# Patient Record
Sex: FEMALE | Race: WHITE | NOT HISPANIC OR LATINO
[De-identification: names, ages, dates, MRNs, and addresses within clinical notes are randomized per-mention and may not be internally consistent; named-entity substitution may affect disease eponyms.]

---

## 2021-08-06 ENCOUNTER — TRANSCRIPTION ENCOUNTER (OUTPATIENT)
Age: 36
End: 2021-08-06

## 2021-09-18 ENCOUNTER — EMERGENCY (EMERGENCY)
Facility: HOSPITAL | Age: 36
LOS: 0 days | Discharge: HOME | End: 2021-09-18
Attending: EMERGENCY MEDICINE
Payer: COMMERCIAL

## 2021-09-18 VITALS
HEART RATE: 84 BPM | DIASTOLIC BLOOD PRESSURE: 72 MMHG | TEMPERATURE: 99 F | RESPIRATION RATE: 18 BRPM | SYSTOLIC BLOOD PRESSURE: 139 MMHG | OXYGEN SATURATION: 100 %

## 2021-09-18 VITALS — WEIGHT: 175.05 LBS | HEIGHT: 65 IN

## 2021-09-18 DIAGNOSIS — S32.029A UNSPECIFIED FRACTURE OF SECOND LUMBAR VERTEBRA, INITIAL ENCOUNTER FOR CLOSED FRACTURE: ICD-10-CM

## 2021-09-18 DIAGNOSIS — S32.019A UNSPECIFIED FRACTURE OF FIRST LUMBAR VERTEBRA, INITIAL ENCOUNTER FOR CLOSED FRACTURE: ICD-10-CM
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
## 2021-09-18 DIAGNOSIS — M54.5 LOW BACK PAIN: ICD-10-CM

## 2021-09-18 DIAGNOSIS — S32.039A UNSPECIFIED FRACTURE OF THIRD LUMBAR VERTEBRA, INITIAL ENCOUNTER FOR CLOSED FRACTURE: ICD-10-CM

## 2021-09-18 PROCEDURE — 99284 EMERGENCY DEPT VISIT MOD MDM: CPT | Mod: 25

## 2021-09-18 PROCEDURE — 99284 EMERGENCY DEPT VISIT MOD MDM: CPT

## 2021-09-18 PROCEDURE — 72131 CT LUMBAR SPINE W/O DYE: CPT | Mod: 26,ME

## 2021-09-18 PROCEDURE — 72131 CT LUMBAR SPINE W/O DYE: CPT

## 2021-09-18 PROCEDURE — G1004: CPT

## 2021-09-18 RX ORDER — OXYCODONE HYDROCHLORIDE 5 MG/1
1 TABLET ORAL
Qty: 3 | Refills: 0
Start: 2021-09-18 | End: 2021-09-20

## 2021-09-18 RX ORDER — LIDOCAINE 4 G/100G
1 CREAM TOPICAL ONCE
Refills: 0 | Status: COMPLETED | OUTPATIENT
Start: 2021-09-18 | End: 2021-09-18

## 2021-09-18 RX ORDER — OXYCODONE AND ACETAMINOPHEN 5; 325 MG/1; MG/1
1 TABLET ORAL ONCE
Refills: 0 | Status: DISCONTINUED | OUTPATIENT
Start: 2021-09-18 | End: 2021-09-18

## 2021-09-18 RX ADMIN — LIDOCAINE 1 PATCH: 4 CREAM TOPICAL at 13:03

## 2021-09-18 RX ADMIN — OXYCODONE AND ACETAMINOPHEN 1 TABLET(S): 5; 325 TABLET ORAL at 13:03

## 2021-09-18 NOTE — ED STATDOCS - CARE PROVIDER_API CALL
Dwight Damico)  Orthopaedic Surgery  [address] [place] [address]  Phone: [phone]  Fax: [phone]  Follow Up Time:

## 2021-09-18 NOTE — ED STATDOCS - NPI NUMBER (FOR SYSADMIN USE ONLY) :
[facility]ON  [address]  [place] [address]  Phone: [phone]    December 4, 2018        Estella Helyan  [address] [place] [id number]          To whom it may concern:    RE: Estella Vernonyan    Please excuse from school 12/5/18.    Please contact me for questions or concerns.      Sincerely,        Aleyda Beltran NP  
[[phone]]

## 2021-09-18 NOTE — ED STATDOCS - MUSCULOSKELETAL, MLM
range of motion is not limited. Large abrasion to right lumbar region with TTP. No midline tenderness. No step off deformity. Right SI tenderness.

## 2021-09-18 NOTE — ED STATDOCS - PATIENT PORTAL LINK FT
You can access the FollowMyHealth Patient Portal offered by [facility] by registering at the following website: [email or web address]. By joining [facility]’s FollowMyHealth portal, you will also be able to view your health information using other applications (apps) compatible with our system.

## 2021-09-18 NOTE — ED STATDOCS - PROGRESS NOTE DETAILS
Corazon Lucero MD (PGY2) -  35yo F no PMH, PSx egg retrieval in May presents with RL back pain. States she was in the attic assessing insulation, suddenly fell, broke her fall, no head trauma. Denies any lightheadedness, dizziness, vision changes prior to the fall. Felt some aching to the lower back after the fall, this pain intensified 20 minutes after the fall. Feels safe at home. Has not taken any meds for relief of symptoms. States she has been walking okay since fall. Initially had some R shoulder pain, which has since self resolved. No urinary/fecal incontinence. Reports intact sensation. MDM: 35yo no PMH presents with right lower back pain after a fall. Has bruising to the R lower back. No spinal tenderness, no step offs, normal gait. Normal sensation. Plan to obtain urine pregnancy, CT lumbar spine, r/o bleed vs spinal injury vs musculoskeletal injury, give analgesics, reassess. TAMARA Corazon Lucero MD (PGY2) - CT with L1-L3 transverse process fracture. Spoke to Dr. Damico re patient, per him okay to discharge at this time and follow up in his office this upcoming Monday. Pt seen & reassessed.  Pt symptomatically improved.  NAD, VSS, pt tolerated PO & ambulated w/steady unassisted gait in the ED.  We discussed the results of ED w/u w/patient (incl. presumptive Emergency Department dx, associated anticipatory guidance, stressing importance of prompt f/u, return precautions), & gave them a copy of results.  Patient verbalized understanding of ED course & agreed with our f/u recommendations, has decisional making capacity.  Pt st they will f/u w/PMD within the next 3 days; pt agrees to call today or tomorrow for an appointment. Pt agrees to return to the ED if there is any worsening or concerning symptoms.

## 2021-09-18 NOTE — ED STATDOCS - NSFOLLOWUPINSTRUCTIONS_ED_ALL_ED_FT
A transverse process fracture is a break or crack in the back part of a vertebra. Your healthcare provider will give you specific instructions. This depends on where the fracture happened and how severe it is.    DISCHARGE INSTRUCTIONS:    Call your local emergency number (911 in the [place]) or have someone else call if:   •You feel lightheaded, short of breath, or have chest pain.  •You cough up blood.  •You cannot move your arms or legs.    For pain:  Rest  Alternate use of Ice/Hot packs  Salonpas (lidocaine patch analog - over the counter)   Tylenol (up to 1000mg or 1 g) up to every 6 hours  Motrin (up to 600mg) up to every 6 hours  Limit exertional activity    Return to the emergency department if:   •Your arm or leg feels warm, tender, and painful. It may look swollen and red.  •You have pain in your abdomen.  •You are dizzy, weak, pale, or faint.  •You are confused or have trouble thinking clearly.  •You see blood in your urine, or your urine is dark.  •You have new problems urinating or having bowel movements.  •You have severe pain.      Call your doctor or orthopedist if:   •You cannot sleep or rest because of pain.  •You have pain or swelling that is getting worse or does not go away.  •You have questions or concerns about your condition or care.    Medicines: You may need any of the following:  •Acetaminophen decreases pain and fever. It is available without a doctor's order. Ask how much to take and how often to take it. Follow directions. Read the labels of all other medicines you are using to see if they also contain acetaminophen, or ask your doctor or pharmacist. Acetaminophen can cause liver damage if not taken correctly. Do not use more than 4 grams (4,000 milligrams) total of acetaminophen in one day.     •NSAIDs, such as ibuprofen, help decrease swelling, pain, and fever. NSAIDs can cause stomach bleeding or kidney problems in certain people. If you take blood thinner medicine, always ask your healthcare provider if NSAIDs are safe for you. Always read the medicine label and follow directions.    •Prescription pain medicine may be given. Ask your healthcare provider how to take this medicine safely. Some prescription pain medicines contain acetaminophen. Do not take other medicines that contain acetaminophen without talking to your healthcare provider. Too much acetaminophen may cause liver damage. Prescription pain medicine may cause constipation. Ask your healthcare provider how to prevent or treat constipation.     •Muscle relaxers may be needed.    •Take your medicine as directed. Contact your healthcare provider if you think your medicine is not helping or if you have side effects. Tell him or her if you are allergic to any medicine. Keep a list of the medicines, vitamins, and herbs you take. Include the amounts, and when and why you take them. Bring the list or the pill bottles to follow-up visits. Carry your medicine list with you in case of an emergency.      Manage your symptoms: The fracture may take weeks or months to heal. The following can help manage your symptoms:    •Rest as directed. Rest will help the fracture heal. Avoid activities that can cause pain or more injury. Begin normal, slow movements as directed by your healthcare provider. Your provider will tell you when it is okay to drive and to return to your normal activities. He or she can also help you make a plan to return to sports, if needed.    •Apply ice to help decrease swelling and pain. Use an ice pack, or put crushed ice in a plastic bag. Cover it with a towel before you apply it to your skin. Apply ice for 15 to 20 minutes every hour or as directed.    •Wear a back brace, if directed. A back brace may help you feel more comfortable while the fracture heals. Your healthcare provider will tell you the kind of brace to use. He or she will tell you how long to wear it each day, and for how many days.    •Go to physical therapy, if directed. A physical therapist teaches you exercises to help improve movement and strength, and to decrease pain.      Prevent more injury:   •Strengthen your muscles and bones. Weight-bearing activities such as walking helps strengthen bones. Activities that help strengthen muscles, such as weightlifting, help protect your bones. Your healthcare provider can help you create a safe physical activity plan. He or she can also help you manage any condition that weakens your bones, such as osteoporosis.    •Reach or maintain a healthy weight. Extra weight puts more stress on your back. Your healthcare provider can help you create a safe weight loss plan, if needed.    •Get more calcium and vitamin D, as directed. Calcium and vitamin D work together to make bones stronger. Good sources of calcium are milk, cheese, broccoli, tofu, almonds, and canned salmon or sardines. Vitamin D is in fish oils, some vegetables, and fortified milk, cereal, or bread. Vitamin D is also formed in the skin when it is exposed to the sun.    •Lower your risk for injuries or accidents. Always wear a seatbelt when you are in a car or other vehicle. Keep walkways in your home clear so you will not trip as you walk. Use handrails when you go up or down stairs.    •Play sports safely. Wear proper protective gear. Take breaks when you practice if your sport involves repeating the same motion. Use proper body mechanics for your sport.    Follow up with your doctor or orthopedist as directed: You may need to return for x-rays or other tests. Write down your questions so you remember to ask them during your visits.

## 2021-09-18 NOTE — ED ADULT TRIAGE NOTE - CHIEF COMPLAINT QUOTE
Pt. to the ED C/O Right Back side Pain S/P Fall from Standing Height in Attic this morning- Pt. denies injuries to head, neck and abdomen- denies blood thinners and major medical hx-

## 2021-09-18 NOTE — ED STATDOCS - CLINICAL SUMMARY MEDICAL DECISION MAKING FREE TEXT BOX
Corazon Lucero MD (PGY2) -  35yo F no PMH presents with RL back pain, bruising to the area. TTP. Plan: CT lumbar spine, analgesics, reassess.

## 2022-08-11 ENCOUNTER — NON-APPOINTMENT (OUTPATIENT)
Age: 37
End: 2022-08-11

## 2022-08-12 PROBLEM — Z00.00 ENCOUNTER FOR PREVENTIVE HEALTH EXAMINATION: Status: ACTIVE | Noted: 2022-08-12

## 2023-02-24 NOTE — ED STATDOCS - OBJECTIVE STATEMENT
done
37 y/o female with no significant PMHx presents to the ED c/o right sided back pain s/p fall at 10:30am today. Pt reports she was removing insulation from the attic when she fell through. Reports he back scraped up against a beam. Not on anticoagulation. Pt took Ibuprofen. No other complaints at this time.

## 2023-11-10 PROBLEM — Z78.9 OTHER SPECIFIED HEALTH STATUS: Chronic | Status: ACTIVE | Noted: 2021-09-18

## 2023-11-21 ENCOUNTER — APPOINTMENT (OUTPATIENT)
Dept: OBGYN | Facility: CLINIC | Age: 38
End: 2023-11-21
Payer: COMMERCIAL

## 2023-11-21 VITALS
SYSTOLIC BLOOD PRESSURE: 124 MMHG | BODY MASS INDEX: 30.51 KG/M2 | WEIGHT: 183.13 LBS | HEIGHT: 65 IN | DIASTOLIC BLOOD PRESSURE: 84 MMHG

## 2023-11-21 PROCEDURE — 99204 OFFICE O/P NEW MOD 45 MIN: CPT | Mod: 25

## 2023-11-21 PROCEDURE — 76817 TRANSVAGINAL US OBSTETRIC: CPT

## 2023-12-01 ENCOUNTER — ASOB RESULT (OUTPATIENT)
Age: 38
End: 2023-12-01

## 2023-12-01 ENCOUNTER — APPOINTMENT (OUTPATIENT)
Dept: MATERNAL FETAL MEDICINE | Facility: CLINIC | Age: 38
End: 2023-12-01
Payer: COMMERCIAL

## 2023-12-01 DIAGNOSIS — O09.819 SUPERVISION OF PREGNANCY RESULTING FROM ASSISTED REPRODUCTIVE TECHNOLOGY, UNSPECIFIED TRIMESTER: ICD-10-CM

## 2023-12-01 PROCEDURE — 99202 OFFICE O/P NEW SF 15 MIN: CPT | Mod: 95

## 2023-12-03 ENCOUNTER — LABORATORY RESULT (OUTPATIENT)
Age: 38
End: 2023-12-03

## 2023-12-04 ENCOUNTER — ASOB RESULT (OUTPATIENT)
Age: 38
End: 2023-12-04

## 2023-12-04 ENCOUNTER — APPOINTMENT (OUTPATIENT)
Dept: ANTEPARTUM | Facility: CLINIC | Age: 38
End: 2023-12-04
Payer: COMMERCIAL

## 2023-12-04 PROCEDURE — 36416 COLLJ CAPILLARY BLOOD SPEC: CPT

## 2023-12-04 PROCEDURE — 76813 OB US NUCHAL MEAS 1 GEST: CPT

## 2023-12-04 PROCEDURE — 36415 COLL VENOUS BLD VENIPUNCTURE: CPT

## 2023-12-08 LAB
ADDITIONAL US: NORMAL
COMMENTS: AFP: NORMAL
CRL SCAN TWIN B: NORMAL
CRL SCAN: NORMAL
CROWN RUMP LENGTH TWIN B: NORMAL
CROWN RUMP LENGTH: 61.1 MM
DOWN SYNDROME AGE RISK: NORMAL
DOWN SYNDROME INTERPRETATION: NORMAL
DOWN SYNDROME SCREENING RISK: NORMAL
GEST. AGE ON COLLECTION DATE: 13.1 WEEKS
HCG MOM: 1.5
HCG VALUE: 115.9 IU/ML
MATERNAL AGE AT EDD: 41 YR
NOTE: AFP: NORMAL
NT MOM TWIN B: NORMAL
NT TWIN B: NORMAL
NUCHAL TRANSLUCENCY (NT): 1.3 MM
NUCHAL TRANSLUCENCY MOM: 0.82
NUMBER OF FETUSES: 1
PAPP-A MOM: 1.06
PAPP-A VALUE: 1072.7 NG/ML
RACE: NORMAL
RESULTS AFP: NORMAL
SONOGRAPHER ID#: NORMAL
SUBMIT PART 2 SAMPLE USING: NORMAL
TEST RESULTS: AFP: NORMAL
TRISOMY 18 AGE RISK: NORMAL
TRISOMY 18 INTERPRETATION: NORMAL
TRISOMY 18 SCREENING RISK: NORMAL
WEIGHT AFP: 183 LBS

## 2023-12-10 ENCOUNTER — NON-APPOINTMENT (OUTPATIENT)
Age: 38
End: 2023-12-10

## 2023-12-19 ENCOUNTER — NON-APPOINTMENT (OUTPATIENT)
Age: 38
End: 2023-12-19

## 2023-12-20 ENCOUNTER — NON-APPOINTMENT (OUTPATIENT)
Age: 38
End: 2023-12-20

## 2023-12-20 ENCOUNTER — APPOINTMENT (OUTPATIENT)
Dept: OBGYN | Facility: CLINIC | Age: 38
End: 2023-12-20
Payer: COMMERCIAL

## 2023-12-20 VITALS
BODY MASS INDEX: 30.32 KG/M2 | WEIGHT: 182 LBS | DIASTOLIC BLOOD PRESSURE: 76 MMHG | SYSTOLIC BLOOD PRESSURE: 120 MMHG | HEIGHT: 65 IN

## 2023-12-20 DIAGNOSIS — Z34.91 ENCOUNTER FOR SUPERVISION OF NORMAL PREGNANCY, UNSPECIFIED, FIRST TRIMESTER: ICD-10-CM

## 2023-12-20 LAB
BILIRUB UR QL STRIP: NORMAL
CLARITY UR: CLEAR
GLUCOSE UR-MCNC: NORMAL
HCG UR QL: 0.2 EU/DL
HGB UR QL STRIP.AUTO: NORMAL
KETONES UR-MCNC: NORMAL
LEUKOCYTE ESTERASE UR QL STRIP: NORMAL
NITRITE UR QL STRIP: NORMAL
PH UR STRIP: 6.5
PROT UR STRIP-MCNC: NORMAL
SP GR UR STRIP: 1.01

## 2023-12-20 PROCEDURE — 99215 OFFICE O/P EST HI 40 MIN: CPT | Mod: 25

## 2023-12-20 PROCEDURE — 81003 URINALYSIS AUTO W/O SCOPE: CPT | Mod: QW

## 2023-12-20 PROCEDURE — 76816 OB US FOLLOW-UP PER FETUS: CPT

## 2023-12-20 PROCEDURE — 36415 COLL VENOUS BLD VENIPUNCTURE: CPT

## 2023-12-20 NOTE — HISTORY OF PRESENT ILLNESS
[FreeTextEntry1] : HPI: Patient is a 37yo female presenting for her New OB visit. IVF pregnancy KAYCEE: 6/15/24 Gestational Age today: 14w4d Patient is without complaints today. Denies pain or VB.  Office US: +FCA, 151 bpm  OB Hx:   Gyn Hx:  No known fibroids, ovarian cysts, or other gynecologic problems  PMH: none PSH: hysteroscopy  Meds: PNV All:NKDA SH: neg x 3  Gyn: Normal appearing external genitalia, normal appearing vagina and cervix  Breast: No lymphadenopathy in the neck, chest wall, bilateral supraclavicular, infraclavicular, and bilateral axillary areas.  No overt asymmetry in bilateral breast contour with normal appearing skin and normal appearing nipple areolar complex bilaterally.  No nipple discharge expressed.  Plan: [x] ACOG labs - T&S, CBC, hgb electrophoresis, rubella, rubeola, varicella, UA/UCx, pap, Lead, HIV/Hep B&C/RPR/GC/CT sent today [x] s/p Influenza vaccine & RSV vaccine recommended [x] Covid vaccine and booster recommendations reviewed with the patient  [x] Expanded Carrier Screening - genetics consult given and screened with IVF [x] NT sono / Ultrascreen appt  normal [x] NIPT LR [x] Nursing interview / packet / [place] scanned [x] Seq 2 [ ] Anatomy sono  [ ] 1hr GCT / CBC [ ] Tdap vaccine  [ ] GBS/HIV/CBC  Obesity - BMI is 30 - Early 1hr GCT - ASA at 12 weeks - Nutrition Consult - Reviewed risks of obesity related DM and HTN in pregnancy along with risks for  and postterm births and labor complications - Serial growth US  AMA - discussed aneuploidy testing and invasive testing options - ASA at 12 weeks - 3rd trimester growth US - Antepartum testing 32-36wks - IOL at 39wks  45mins spent excluding sono RTO 4wks  JEFE Small MD

## 2023-12-28 ENCOUNTER — TRANSCRIPTION ENCOUNTER (OUTPATIENT)
Age: 38
End: 2023-12-28

## 2024-01-16 ENCOUNTER — NON-APPOINTMENT (OUTPATIENT)
Age: 39
End: 2024-01-16

## 2024-01-17 ENCOUNTER — NON-APPOINTMENT (OUTPATIENT)
Age: 39
End: 2024-01-17

## 2024-01-17 ENCOUNTER — APPOINTMENT (OUTPATIENT)
Dept: OBGYN | Facility: CLINIC | Age: 39
End: 2024-01-17
Payer: COMMERCIAL

## 2024-01-17 VITALS
WEIGHT: 182 LBS | HEIGHT: 65 IN | BODY MASS INDEX: 30.32 KG/M2 | SYSTOLIC BLOOD PRESSURE: 118 MMHG | DIASTOLIC BLOOD PRESSURE: 78 MMHG

## 2024-01-17 PROCEDURE — 0502F SUBSEQUENT PRENATAL CARE: CPT

## 2024-01-23 ENCOUNTER — APPOINTMENT (OUTPATIENT)
Dept: PEDIATRIC CARDIOLOGY | Facility: CLINIC | Age: 39
End: 2024-01-23
Payer: COMMERCIAL

## 2024-01-23 PROCEDURE — 76825 ECHO EXAM OF FETAL HEART: CPT

## 2024-01-23 PROCEDURE — 99203 OFFICE O/P NEW LOW 30 MIN: CPT | Mod: 25

## 2024-01-23 PROCEDURE — 76827 ECHO EXAM OF FETAL HEART: CPT

## 2024-01-23 PROCEDURE — 93325 DOPPLER ECHO COLOR FLOW MAPG: CPT | Mod: 59

## 2024-01-23 PROCEDURE — 76820 UMBILICAL ARTERY ECHO: CPT

## 2024-01-26 ENCOUNTER — NON-APPOINTMENT (OUTPATIENT)
Age: 39
End: 2024-01-26

## 2024-01-29 ENCOUNTER — NON-APPOINTMENT (OUTPATIENT)
Age: 39
End: 2024-01-29

## 2024-01-29 ENCOUNTER — ASOB RESULT (OUTPATIENT)
Age: 39
End: 2024-01-29

## 2024-01-29 ENCOUNTER — APPOINTMENT (OUTPATIENT)
Dept: ANTEPARTUM | Facility: CLINIC | Age: 39
End: 2024-01-29
Payer: COMMERCIAL

## 2024-01-29 PROCEDURE — 76811 OB US DETAILED SNGL FETUS: CPT

## 2024-02-19 ENCOUNTER — NON-APPOINTMENT (OUTPATIENT)
Age: 39
End: 2024-02-19

## 2024-02-21 ENCOUNTER — APPOINTMENT (OUTPATIENT)
Dept: OBGYN | Facility: CLINIC | Age: 39
End: 2024-02-21
Payer: COMMERCIAL

## 2024-02-21 VITALS
DIASTOLIC BLOOD PRESSURE: 64 MMHG | HEIGHT: 65 IN | SYSTOLIC BLOOD PRESSURE: 130 MMHG | BODY MASS INDEX: 30.99 KG/M2 | WEIGHT: 186 LBS

## 2024-02-21 VITALS — DIASTOLIC BLOOD PRESSURE: 80 MMHG | SYSTOLIC BLOOD PRESSURE: 122 MMHG

## 2024-02-21 PROCEDURE — 81003 URINALYSIS AUTO W/O SCOPE: CPT | Mod: NC,QW

## 2024-02-21 PROCEDURE — 0502F SUBSEQUENT PRENATAL CARE: CPT

## 2024-02-27 LAB
ABO + RH PNL BLD: NORMAL
ADDITIONAL US: NORMAL
AFP MOM: 2.09
AFP VALUE: 90.4 NG/ML
APPEARANCE: CLEAR
APPEARANCE: CLEAR
BACTERIA UR CULT: NORMAL
BASOPHILS # BLD AUTO: 0.02 K/UL
BASOPHILS NFR BLD AUTO: 0.2 %
BILIRUBIN URINE: NEGATIVE
BILIRUBIN URINE: NEGATIVE
BLD GP AB SCN SERPL QL: NORMAL
BLOOD URINE: NEGATIVE
BLOOD URINE: NEGATIVE
C TRACH RRNA SPEC QL NAA+PROBE: NOT DETECTED
COLLECTED ON 2: NORMAL
COLLECTED ON: NORMAL
COLOR: YELLOW
COLOR: YELLOW
CRL SCAN TWIN B: NORMAL
CRL SCAN: NORMAL
CROWN RUMP LENGTH TWIN B: NORMAL
CROWN RUMP LENGTH: 61.1 MM
CYTOLOGY CVX/VAG DOC THIN PREP: NORMAL
DIA MOM: 0.67
DIA VALUE: 99.1 PG/ML
DOWN SYNDROME AGE RISK: NORMAL
DOWN SYNDROME INTERPRETATION: NORMAL
DOWN SYNDROME SCREENING RISK: NORMAL
EOSINOPHIL # BLD AUTO: 0.09 K/UL
EOSINOPHIL NFR BLD AUTO: 1 %
ESTIMATED AVERAGE GLUCOSE: 108 MG/DL
FIRST TRIMESTER SAMPLE: NORMAL
GEST. AGE ON COLLECTION DATE: 13.1 WEEKS
GESTATIONAL AGE: 19.4 WEEKS
GLUCOSE 1H P 50 G GLC PO SERPL-MCNC: 147 MG/DL
GLUCOSE QUALITATIVE U: NEGATIVE
GLUCOSE QUALITATIVE U: NEGATIVE MG/DL
HBA1C MFR BLD HPLC: 5.4 %
HBV SURFACE AG SER QL: NONREACTIVE
HCG MOM: 1.47
HCG VALUE: 30.2 IU/ML
HCT VFR BLD CALC: 35.9 %
HCV AB SER QL: NONREACTIVE
HCV S/CO RATIO: 0.08 S/CO
HGB A MFR BLD: 97.2 %
HGB A2 MFR BLD: 2.8 %
HGB BLD-MCNC: 12.3 G/DL
HGB FRACT BLD-IMP: NORMAL
HIV1+2 AB SPEC QL IA.RAPID: NONREACTIVE
HPV HIGH+LOW RISK DNA PNL CVX: NOT DETECTED
IMM GRANULOCYTES NFR BLD AUTO: 0.3 %
INSULIN DEP DIABETES: NO
KETONES URINE: NEGATIVE
KETONES URINE: NEGATIVE MG/DL
LEAD BLD-MCNC: <1 UG/DL
LEUKOCYTE ESTERASE URINE: NEGATIVE
LEUKOCYTE ESTERASE URINE: NEGATIVE
LYMPHOCYTES # BLD AUTO: 3.03 K/UL
LYMPHOCYTES NFR BLD AUTO: 32 %
M TB IFN-G BLD-IMP: NEGATIVE
MAN DIFF?: NORMAL
MATERNAL AGE AT EDD: 41 YR
MCHC RBC-ENTMCNC: 30.6 PG
MCHC RBC-ENTMCNC: 34.3 GM/DL
MCV RBC AUTO: 89.3 FL
MEV IGG FLD QL IA: 26.2 AU/ML
MEV IGG+IGM SER-IMP: POSITIVE
MONOCYTES # BLD AUTO: 0.57 K/UL
MONOCYTES NFR BLD AUTO: 6 %
N GONORRHOEA RRNA SPEC QL NAA+PROBE: NOT DETECTED
NEUTROPHILS # BLD AUTO: 5.73 K/UL
NEUTROPHILS NFR BLD AUTO: 60.5 %
NITRITE URINE: NEGATIVE
NITRITE URINE: NEGATIVE
NT MOM TWIN B: NORMAL
NT TWIN B: NORMAL
NUCHAL TRANSLUCENCY (NT): 1.3 MM
NUCHAL TRANSLUCENCY MOM: 0.82
NUMBER OF FETUSES: 1
OPEN SPINA BIFIDA: NORMAL
OSB INTERPRETATION: NORMAL
PAPP-A MOM: 1.06
PAPP-A VALUE: 1072.7 NG/ML
PH URINE: 6.5
PH URINE: 7
PLATELET # BLD AUTO: 306 K/UL
PROTEIN URINE: NEGATIVE
PROTEIN URINE: NEGATIVE MG/DL
QUANTIFERON TB PLUS MITOGEN MINUS NIL: >10 IU/ML
QUANTIFERON TB PLUS NIL: 0.03 IU/ML
QUANTIFERON TB PLUS TB1 MINUS NIL: 0 IU/ML
QUANTIFERON TB PLUS TB2 MINUS NIL: 0 IU/ML
RACE: NORMAL
RBC # BLD: 4.02 M/UL
RBC # FLD: 12.5 %
RUBV IGG FLD-ACNC: 9.1 INDEX
RUBV IGG SER-IMP: POSITIVE
SECOND TRIMESTER SAMPLE: NORMAL
SEQUENTIAL 2 COMMENTS: NORMAL
SEQUENTIAL 2 NOTE: NORMAL
SEQUENTIAL 2 RESULTS: NORMAL
SEQUENTIAL 2 TEST RESULTS: NORMAL
SONOGRAPHER ID#: NORMAL
SOURCE AMPLIFICATION: NORMAL
SOURCE TP AMPLIFICATION: NORMAL
SPECIFIC GRAVITY URINE: 1.01
SPECIFIC GRAVITY URINE: 1.01
T PALLIDUM AB SER QL IA: NEGATIVE
T VAGINALIS RRNA SPEC QL NAA+PROBE: NOT DETECTED
TRISOMY 18 AGE RISK: NORMAL
TRISOMY 18 INTERPRETATION: NORMAL
TRISOMY 18 SCREENING RISK: NORMAL
UE3 MOM: 0.78
UE3 VALUE: 1.44 NG/ML
UROBILINOGEN URINE: 0.2 (ref 0.2–?)
UROBILINOGEN URINE: 0.2 MG/DL
VZV AB TITR SER: POSITIVE
VZV IGG SER IF-ACNC: 1805 INDEX
WBC # FLD AUTO: 9.47 K/UL
WEIGHT AFP: 183 LBS
WEIGHT: 185 LBS

## 2024-03-20 ENCOUNTER — NON-APPOINTMENT (OUTPATIENT)
Age: 39
End: 2024-03-20

## 2024-03-21 ENCOUNTER — APPOINTMENT (OUTPATIENT)
Dept: OBGYN | Facility: CLINIC | Age: 39
End: 2024-03-21
Payer: COMMERCIAL

## 2024-03-21 VITALS
HEIGHT: 65 IN | WEIGHT: 191 LBS | DIASTOLIC BLOOD PRESSURE: 70 MMHG | BODY MASS INDEX: 31.82 KG/M2 | RESPIRATION RATE: 61 BRPM | SYSTOLIC BLOOD PRESSURE: 116 MMHG

## 2024-03-21 DIAGNOSIS — Z34.92 ENCOUNTER FOR SUPERVISION OF NORMAL PREGNANCY, UNSPECIFIED, SECOND TRIMESTER: ICD-10-CM

## 2024-03-21 PROCEDURE — 81003 URINALYSIS AUTO W/O SCOPE: CPT | Mod: NC,QW

## 2024-03-21 PROCEDURE — 0502F SUBSEQUENT PRENATAL CARE: CPT

## 2024-03-29 ENCOUNTER — ASOB RESULT (OUTPATIENT)
Age: 39
End: 2024-03-29

## 2024-03-29 ENCOUNTER — APPOINTMENT (OUTPATIENT)
Dept: ANTEPARTUM | Facility: CLINIC | Age: 39
End: 2024-03-29
Payer: COMMERCIAL

## 2024-03-29 PROCEDURE — 76819 FETAL BIOPHYS PROFIL W/O NST: CPT | Mod: 59

## 2024-03-29 PROCEDURE — 76816 OB US FOLLOW-UP PER FETUS: CPT

## 2024-04-12 LAB
BASOPHILS # BLD AUTO: 0.02 K/UL
BASOPHILS NFR BLD AUTO: 0.2 %
EOSINOPHIL # BLD AUTO: 0.13 K/UL
EOSINOPHIL NFR BLD AUTO: 1.4 %
HCT VFR BLD CALC: 35.5 %
HGB BLD-MCNC: 11.9 G/DL
IMM GRANULOCYTES NFR BLD AUTO: 0.9 %
LYMPHOCYTES # BLD AUTO: 1.89 K/UL
LYMPHOCYTES NFR BLD AUTO: 19.7 %
MAN DIFF?: NORMAL
MCHC RBC-ENTMCNC: 30.8 PG
MCHC RBC-ENTMCNC: 33.5 GM/DL
MCV RBC AUTO: 92 FL
MONOCYTES # BLD AUTO: 0.48 K/UL
MONOCYTES NFR BLD AUTO: 5 %
NEUTROPHILS # BLD AUTO: 6.98 K/UL
NEUTROPHILS NFR BLD AUTO: 72.8 %
PLATELET # BLD AUTO: 285 K/UL
RBC # BLD: 3.86 M/UL
RBC # FLD: 13.2 %
T PALLIDUM AB SER QL IA: NEGATIVE
WBC # FLD AUTO: 9.59 K/UL

## 2024-04-17 ENCOUNTER — NON-APPOINTMENT (OUTPATIENT)
Age: 39
End: 2024-04-17

## 2024-04-17 ENCOUNTER — APPOINTMENT (OUTPATIENT)
Dept: MATERNAL FETAL MEDICINE | Facility: CLINIC | Age: 39
End: 2024-04-17
Payer: COMMERCIAL

## 2024-04-17 ENCOUNTER — ASOB RESULT (OUTPATIENT)
Age: 39
End: 2024-04-17

## 2024-04-17 VITALS — HEIGHT: 65 IN | BODY MASS INDEX: 31.82 KG/M2 | WEIGHT: 191 LBS

## 2024-04-17 DIAGNOSIS — O24.410 GESTATIONAL DIABETES MELLITUS IN PREGNANCY, DIET CONTROLLED: ICD-10-CM

## 2024-04-17 PROCEDURE — G0108 DIAB MANAGE TRN  PER INDIV: CPT | Mod: 95

## 2024-04-17 RX ORDER — LANCETS 33 GAUGE
EACH MISCELLANEOUS
Qty: 1 | Refills: 2 | Status: ACTIVE | COMMUNITY
Start: 2024-04-17 | End: 1900-01-01

## 2024-04-17 RX ORDER — BLOOD-GLUCOSE METER
W/DEVICE EACH MISCELLANEOUS
Qty: 1 | Refills: 0 | Status: ACTIVE | COMMUNITY
Start: 2024-04-17 | End: 1900-01-01

## 2024-04-17 RX ORDER — BLOOD SUGAR DIAGNOSTIC
STRIP MISCELLANEOUS 4 TIMES DAILY
Qty: 1 | Refills: 2 | Status: ACTIVE | COMMUNITY
Start: 2024-04-17 | End: 1900-01-01

## 2024-04-18 ENCOUNTER — APPOINTMENT (OUTPATIENT)
Dept: OBGYN | Facility: CLINIC | Age: 39
End: 2024-04-18
Payer: COMMERCIAL

## 2024-04-18 VITALS
SYSTOLIC BLOOD PRESSURE: 142 MMHG | HEIGHT: 65 IN | WEIGHT: 197 LBS | DIASTOLIC BLOOD PRESSURE: 90 MMHG | BODY MASS INDEX: 32.82 KG/M2

## 2024-04-18 VITALS — SYSTOLIC BLOOD PRESSURE: 134 MMHG | DIASTOLIC BLOOD PRESSURE: 84 MMHG

## 2024-04-18 LAB
BILIRUB UR QL STRIP: NORMAL
GLUCOSE UR-MCNC: NORMAL
HCG UR QL: 0.2 EU/DL
HGB UR QL STRIP.AUTO: NORMAL
KETONES UR-MCNC: NORMAL
LEUKOCYTE ESTERASE UR QL STRIP: NORMAL
NITRITE UR QL STRIP: NORMAL
PH UR STRIP: 7
PROT UR STRIP-MCNC: NORMAL
SP GR UR STRIP: 1.01

## 2024-04-18 PROCEDURE — 90471 IMMUNIZATION ADMIN: CPT

## 2024-04-18 PROCEDURE — 0502F SUBSEQUENT PRENATAL CARE: CPT

## 2024-04-18 PROCEDURE — 36415 COLL VENOUS BLD VENIPUNCTURE: CPT

## 2024-04-18 PROCEDURE — 81003 URINALYSIS AUTO W/O SCOPE: CPT | Mod: NC,QW

## 2024-04-18 PROCEDURE — 90715 TDAP VACCINE 7 YRS/> IM: CPT

## 2024-04-26 ENCOUNTER — APPOINTMENT (OUTPATIENT)
Dept: ANTEPARTUM | Facility: CLINIC | Age: 39
End: 2024-04-26
Payer: COMMERCIAL

## 2024-04-26 ENCOUNTER — ASOB RESULT (OUTPATIENT)
Age: 39
End: 2024-04-26

## 2024-04-26 LAB
ALBUMIN SERPL ELPH-MCNC: 3.6 G/DL
ALP BLD-CCNC: 108 U/L
ALT SERPL-CCNC: 15 U/L
ANION GAP SERPL CALC-SCNC: 15 MMOL/L
AST SERPL-CCNC: 16 U/L
BASOPHILS # BLD AUTO: 0.01 K/UL
BASOPHILS NFR BLD AUTO: 0.1 %
BILIRUB SERPL-MCNC: 0.2 MG/DL
BUN SERPL-MCNC: 9 MG/DL
CALCIUM SERPL-MCNC: 8.8 MG/DL
CHLORIDE SERPL-SCNC: 103 MMOL/L
CO2 SERPL-SCNC: 20 MMOL/L
CREAT SERPL-MCNC: 0.69 MG/DL
CREAT SPEC-SCNC: 37 MG/DL
CREAT/PROT UR: NORMAL RATIO
EGFR: 112 ML/MIN/1.73M2
EOSINOPHIL # BLD AUTO: 0.08 K/UL
EOSINOPHIL NFR BLD AUTO: 0.9 %
GLUCOSE SERPL-MCNC: 68 MG/DL
HCT VFR BLD CALC: 33.9 %
HGB BLD-MCNC: 11.1 G/DL
IMM GRANULOCYTES NFR BLD AUTO: 0.7 %
LYMPHOCYTES # BLD AUTO: 2.24 K/UL
LYMPHOCYTES NFR BLD AUTO: 25.9 %
MAN DIFF?: NORMAL
MCHC RBC-ENTMCNC: 30.3 PG
MCHC RBC-ENTMCNC: 32.7 GM/DL
MCV RBC AUTO: 92.6 FL
MONOCYTES # BLD AUTO: 0.54 K/UL
MONOCYTES NFR BLD AUTO: 6.3 %
NEUTROPHILS # BLD AUTO: 5.71 K/UL
NEUTROPHILS NFR BLD AUTO: 66.1 %
PLATELET # BLD AUTO: 279 K/UL
POTASSIUM SERPL-SCNC: 4.2 MMOL/L
PROT SERPL-MCNC: 6 G/DL
PROT UR-MCNC: <4 MG/DL
RBC # BLD: 3.66 M/UL
RBC # FLD: 13 %
SODIUM SERPL-SCNC: 139 MMOL/L
WBC # FLD AUTO: 8.64 K/UL

## 2024-04-26 PROCEDURE — 76816 OB US FOLLOW-UP PER FETUS: CPT

## 2024-04-29 DIAGNOSIS — O09.519 SUPERVISION OF ELDERLY PRIMIGRAVIDA, UNSPECIFIED TRIMESTER: ICD-10-CM

## 2024-04-30 ENCOUNTER — APPOINTMENT (OUTPATIENT)
Dept: OBGYN | Facility: CLINIC | Age: 39
End: 2024-04-30
Payer: COMMERCIAL

## 2024-04-30 VITALS
HEIGHT: 65 IN | WEIGHT: 195 LBS | DIASTOLIC BLOOD PRESSURE: 86 MMHG | BODY MASS INDEX: 32.49 KG/M2 | SYSTOLIC BLOOD PRESSURE: 114 MMHG

## 2024-04-30 LAB
BILIRUB UR QL STRIP: NORMAL
CLARITY UR: CLEAR
COLLECTION METHOD: NORMAL
GLUCOSE UR-MCNC: NORMAL
HCG UR QL: 0.2 EU/DL
HGB UR QL STRIP.AUTO: NORMAL
KETONES UR-MCNC: NORMAL
LEUKOCYTE ESTERASE UR QL STRIP: NORMAL
NITRITE UR QL STRIP: NORMAL
PH UR STRIP: 6.5
PROT UR STRIP-MCNC: NORMAL
SP GR UR STRIP: 1.02

## 2024-04-30 PROCEDURE — 0502F SUBSEQUENT PRENATAL CARE: CPT

## 2024-05-03 ENCOUNTER — APPOINTMENT (OUTPATIENT)
Dept: MATERNAL FETAL MEDICINE | Facility: CLINIC | Age: 39
End: 2024-05-03
Payer: COMMERCIAL

## 2024-05-03 ENCOUNTER — APPOINTMENT (OUTPATIENT)
Dept: ANTEPARTUM | Facility: CLINIC | Age: 39
End: 2024-05-03
Payer: COMMERCIAL

## 2024-05-03 VITALS
BODY MASS INDEX: 32.45 KG/M2 | DIASTOLIC BLOOD PRESSURE: 80 MMHG | OXYGEN SATURATION: 97 % | WEIGHT: 195 LBS | HEART RATE: 86 BPM | SYSTOLIC BLOOD PRESSURE: 120 MMHG

## 2024-05-03 DIAGNOSIS — Z3A.33 33 WEEKS GESTATION OF PREGNANCY: ICD-10-CM

## 2024-05-03 PROCEDURE — ZZZZZ: CPT

## 2024-05-03 PROCEDURE — 99204 OFFICE O/P NEW MOD 45 MIN: CPT

## 2024-05-03 PROCEDURE — 99214 OFFICE O/P EST MOD 30 MIN: CPT

## 2024-05-03 RX ORDER — KRILL/OM-3/DHA/EPA/PHOSPHO/AST 1000-230MG
81 CAPSULE ORAL
Refills: 0 | Status: ACTIVE | COMMUNITY
Start: 2024-05-03

## 2024-05-03 RX ORDER — CHOLECALCIFEROL (VITAMIN D3) 25 MCG
27-0.8-228 TABLET,CHEWABLE ORAL
Refills: 0 | Status: ACTIVE | COMMUNITY
Start: 2024-05-03

## 2024-05-03 RX ORDER — OMEPRAZOLE MAGNESIUM 20 MG/1
20 TABLET, DELAYED RELEASE ORAL DAILY
Refills: 0 | Status: ACTIVE | COMMUNITY
Start: 2024-05-03

## 2024-05-03 NOTE — DISCUSSION/SUMMARY
[FreeTextEntry1] : Delivery is planned at 39+ weeks.   Weekly fetal testing to begin at 36 weeks.   Repeat growth scan in 3 weeks.   Continue testing glucose 4X daily

## 2024-05-03 NOTE — HISTORY OF PRESENT ILLNESS
[FreeTextEntry1] : Dorie presents for evaluation of glucose control.  She had an interval growth sonogram performed last week

## 2024-05-03 NOTE — DATA REVIEWED
[FreeTextEntry1] : We reviewed the sonogram which was performed last week, demonstratng overall size at the 56th percentile.   Weekly fetal testing is scheduled at 36 weeks.   Review of home glucose shows excellent control of fasting and post prandial with occasional mild elevations due to dietary choices, but Dorie has a good understanding of the diet and will continue to adjust as needed.   We reviewed the plans for the rest of the pregnancy as far as monitoring, and she states there is a plan for delivery at 39+ weeks.

## 2024-05-13 ENCOUNTER — NON-APPOINTMENT (OUTPATIENT)
Age: 39
End: 2024-05-13

## 2024-05-13 ENCOUNTER — APPOINTMENT (OUTPATIENT)
Dept: MATERNAL FETAL MEDICINE | Facility: CLINIC | Age: 39
End: 2024-05-13
Payer: COMMERCIAL

## 2024-05-13 ENCOUNTER — ASOB RESULT (OUTPATIENT)
Age: 39
End: 2024-05-13

## 2024-05-13 VITALS — WEIGHT: 195 LBS | BODY MASS INDEX: 32.49 KG/M2 | HEIGHT: 65 IN

## 2024-05-13 PROCEDURE — 98960 EDU&TRN PT SELF-MGMT NQHP 1: CPT | Mod: 95

## 2024-05-13 PROCEDURE — G0108 DIAB MANAGE TRN  PER INDIV: CPT | Mod: 95

## 2024-05-14 ENCOUNTER — APPOINTMENT (OUTPATIENT)
Dept: OBGYN | Facility: CLINIC | Age: 39
End: 2024-05-14
Payer: COMMERCIAL

## 2024-05-14 VITALS
SYSTOLIC BLOOD PRESSURE: 122 MMHG | WEIGHT: 198 LBS | DIASTOLIC BLOOD PRESSURE: 86 MMHG | BODY MASS INDEX: 32.99 KG/M2 | HEIGHT: 65 IN

## 2024-05-14 DIAGNOSIS — Z34.93 ENCOUNTER FOR SUPERVISION OF NORMAL PREGNANCY, UNSPECIFIED, THIRD TRIMESTER: ICD-10-CM

## 2024-05-14 PROCEDURE — 0502F SUBSEQUENT PRENATAL CARE: CPT

## 2024-05-14 PROCEDURE — 36415 COLL VENOUS BLD VENIPUNCTURE: CPT

## 2024-05-14 PROCEDURE — 81003 URINALYSIS AUTO W/O SCOPE: CPT | Mod: NC,QW

## 2024-05-16 LAB
BASOPHILS # BLD AUTO: 0.01 K/UL
BASOPHILS NFR BLD AUTO: 0.1 %
EOSINOPHIL # BLD AUTO: 0.07 K/UL
EOSINOPHIL NFR BLD AUTO: 0.7 %
HCT VFR BLD CALC: 37.9 %
HGB BLD-MCNC: 12 G/DL
HIV1+2 AB SPEC QL IA.RAPID: NONREACTIVE
IMM GRANULOCYTES NFR BLD AUTO: 0.5 %
LYMPHOCYTES # BLD AUTO: 2.13 K/UL
LYMPHOCYTES NFR BLD AUTO: 20.3 %
MAN DIFF?: NORMAL
MCHC RBC-ENTMCNC: 29.4 PG
MCHC RBC-ENTMCNC: 31.7 GM/DL
MCV RBC AUTO: 92.9 FL
MONOCYTES # BLD AUTO: 0.58 K/UL
MONOCYTES NFR BLD AUTO: 5.5 %
NEUTROPHILS # BLD AUTO: 7.63 K/UL
NEUTROPHILS NFR BLD AUTO: 72.9 %
PLATELET # BLD AUTO: 252 K/UL
RBC # BLD: 4.08 M/UL
RBC # FLD: 13.5 %
T PALLIDUM AB SER QL IA: NEGATIVE
WBC # FLD AUTO: 10.47 K/UL

## 2024-05-20 ENCOUNTER — APPOINTMENT (OUTPATIENT)
Dept: ANTEPARTUM | Facility: CLINIC | Age: 39
End: 2024-05-20
Payer: COMMERCIAL

## 2024-05-20 ENCOUNTER — ASOB RESULT (OUTPATIENT)
Age: 39
End: 2024-05-20

## 2024-05-20 ENCOUNTER — OUTPATIENT (OUTPATIENT)
Dept: INPATIENT UNIT | Facility: HOSPITAL | Age: 39
LOS: 1 days | Discharge: HOME | End: 2024-05-20
Payer: COMMERCIAL

## 2024-05-20 VITALS
TEMPERATURE: 98 F | HEART RATE: 88 BPM | DIASTOLIC BLOOD PRESSURE: 98 MMHG | RESPIRATION RATE: 18 BRPM | SYSTOLIC BLOOD PRESSURE: 135 MMHG

## 2024-05-20 DIAGNOSIS — O26.899 OTHER SPECIFIED PREGNANCY RELATED CONDITIONS, UNSPECIFIED TRIMESTER: ICD-10-CM

## 2024-05-20 LAB
ALBUMIN SERPL ELPH-MCNC: 2.5 G/DL — LOW (ref 3.3–5)
ALP SERPL-CCNC: 156 U/L — HIGH (ref 40–120)
ALT FLD-CCNC: 24 U/L — SIGNIFICANT CHANGE UP (ref 12–78)
ANION GAP SERPL CALC-SCNC: 9 MMOL/L — SIGNIFICANT CHANGE UP (ref 5–17)
APPEARANCE UR: CLEAR — SIGNIFICANT CHANGE UP
AST SERPL-CCNC: 17 U/L — SIGNIFICANT CHANGE UP (ref 15–37)
BASOPHILS # BLD AUTO: 0.01 K/UL — SIGNIFICANT CHANGE UP (ref 0–0.2)
BASOPHILS NFR BLD AUTO: 0.1 % — SIGNIFICANT CHANGE UP (ref 0–2)
BILIRUB SERPL-MCNC: 0.3 MG/DL — SIGNIFICANT CHANGE UP (ref 0.2–1.2)
BILIRUB UR-MCNC: NEGATIVE — SIGNIFICANT CHANGE UP
BUN SERPL-MCNC: 8 MG/DL — SIGNIFICANT CHANGE UP (ref 7–23)
CALCIUM SERPL-MCNC: 9.2 MG/DL — SIGNIFICANT CHANGE UP (ref 8.5–10.1)
CHLORIDE SERPL-SCNC: 110 MMOL/L — HIGH (ref 96–108)
CO2 SERPL-SCNC: 20 MMOL/L — LOW (ref 22–31)
COLOR SPEC: YELLOW — SIGNIFICANT CHANGE UP
CREAT ?TM UR-MCNC: 24 MG/DL — SIGNIFICANT CHANGE UP
CREAT SERPL-MCNC: 0.69 MG/DL — SIGNIFICANT CHANGE UP (ref 0.5–1.3)
DIFF PNL FLD: NEGATIVE — SIGNIFICANT CHANGE UP
EGFR: 112 ML/MIN/1.73M2 — SIGNIFICANT CHANGE UP
EOSINOPHIL # BLD AUTO: 0.06 K/UL — SIGNIFICANT CHANGE UP (ref 0–0.5)
EOSINOPHIL NFR BLD AUTO: 0.6 % — SIGNIFICANT CHANGE UP (ref 0–6)
GLUCOSE SERPL-MCNC: 78 MG/DL — SIGNIFICANT CHANGE UP (ref 70–99)
GLUCOSE UR QL: NEGATIVE MG/DL — SIGNIFICANT CHANGE UP
GP B STREP DNA SPEC QL NAA+PROBE: NOT DETECTED
HCT VFR BLD CALC: 35.7 % — SIGNIFICANT CHANGE UP (ref 34.5–45)
HGB BLD-MCNC: 11.9 G/DL — SIGNIFICANT CHANGE UP (ref 11.5–15.5)
IMM GRANULOCYTES NFR BLD AUTO: 0.6 % — SIGNIFICANT CHANGE UP (ref 0–0.9)
KETONES UR-MCNC: ABNORMAL MG/DL
LDH SERPL L TO P-CCNC: 173 U/L — SIGNIFICANT CHANGE UP (ref 84–241)
LEUKOCYTE ESTERASE UR-ACNC: NEGATIVE — SIGNIFICANT CHANGE UP
LYMPHOCYTES # BLD AUTO: 2.67 K/UL — SIGNIFICANT CHANGE UP (ref 1–3.3)
LYMPHOCYTES # BLD AUTO: 27.1 % — SIGNIFICANT CHANGE UP (ref 13–44)
MCHC RBC-ENTMCNC: 30.3 PG — SIGNIFICANT CHANGE UP (ref 27–34)
MCHC RBC-ENTMCNC: 33.3 GM/DL — SIGNIFICANT CHANGE UP (ref 32–36)
MCV RBC AUTO: 90.8 FL — SIGNIFICANT CHANGE UP (ref 80–100)
MONOCYTES # BLD AUTO: 0.71 K/UL — SIGNIFICANT CHANGE UP (ref 0–0.9)
MONOCYTES NFR BLD AUTO: 7.2 % — SIGNIFICANT CHANGE UP (ref 2–14)
NEUTROPHILS # BLD AUTO: 6.33 K/UL — SIGNIFICANT CHANGE UP (ref 1.8–7.4)
NEUTROPHILS NFR BLD AUTO: 64.4 % — SIGNIFICANT CHANGE UP (ref 43–77)
NITRITE UR-MCNC: NEGATIVE — SIGNIFICANT CHANGE UP
PH UR: 7 — SIGNIFICANT CHANGE UP (ref 5–8)
PLATELET # BLD AUTO: 234 K/UL — SIGNIFICANT CHANGE UP (ref 150–400)
POTASSIUM SERPL-MCNC: 3.7 MMOL/L — SIGNIFICANT CHANGE UP (ref 3.5–5.3)
POTASSIUM SERPL-SCNC: 3.7 MMOL/L — SIGNIFICANT CHANGE UP (ref 3.5–5.3)
PROT ?TM UR-MCNC: <5 MG/DL — SIGNIFICANT CHANGE UP (ref 0–12)
PROT SERPL-MCNC: 6.1 GM/DL — SIGNIFICANT CHANGE UP (ref 6–8.3)
PROT UR-MCNC: NEGATIVE MG/DL — SIGNIFICANT CHANGE UP
PROT/CREAT UR-RTO: <0.2 RATIO — SIGNIFICANT CHANGE UP (ref 0–0.2)
RBC # BLD: 3.93 M/UL — SIGNIFICANT CHANGE UP (ref 3.8–5.2)
RBC # FLD: 13.1 % — SIGNIFICANT CHANGE UP (ref 10.3–14.5)
SODIUM SERPL-SCNC: 139 MMOL/L — SIGNIFICANT CHANGE UP (ref 135–145)
SOURCE GBS: NORMAL
SP GR SPEC: 1 — SIGNIFICANT CHANGE UP (ref 1–1.03)
URATE SERPL-MCNC: 5.5 MG/DL — SIGNIFICANT CHANGE UP (ref 2.5–7)
UROBILINOGEN FLD QL: 0.2 MG/DL — SIGNIFICANT CHANGE UP (ref 0.2–1)
WBC # BLD: 9.84 K/UL — SIGNIFICANT CHANGE UP (ref 3.8–10.5)
WBC # FLD AUTO: 9.84 K/UL — SIGNIFICANT CHANGE UP (ref 3.8–10.5)

## 2024-05-20 PROCEDURE — 85025 COMPLETE CBC W/AUTO DIFF WBC: CPT

## 2024-05-20 PROCEDURE — ZZZZZ: CPT

## 2024-05-20 PROCEDURE — 80053 COMPREHEN METABOLIC PANEL: CPT

## 2024-05-20 PROCEDURE — 59025 FETAL NON-STRESS TEST: CPT | Mod: 26,59

## 2024-05-20 PROCEDURE — 59025 FETAL NON-STRESS TEST: CPT

## 2024-05-20 PROCEDURE — 76816 OB US FOLLOW-UP PER FETUS: CPT

## 2024-05-20 PROCEDURE — 99214 OFFICE O/P EST MOD 30 MIN: CPT

## 2024-05-20 PROCEDURE — 36415 COLL VENOUS BLD VENIPUNCTURE: CPT

## 2024-05-20 PROCEDURE — 84156 ASSAY OF PROTEIN URINE: CPT

## 2024-05-20 PROCEDURE — 81003 URINALYSIS AUTO W/O SCOPE: CPT

## 2024-05-20 PROCEDURE — 84550 ASSAY OF BLOOD/URIC ACID: CPT

## 2024-05-20 PROCEDURE — 99221 1ST HOSP IP/OBS SF/LOW 40: CPT | Mod: 25,GC

## 2024-05-20 PROCEDURE — 76818 FETAL BIOPHYS PROFILE W/NST: CPT

## 2024-05-20 PROCEDURE — 83615 LACTATE (LD) (LDH) ENZYME: CPT

## 2024-05-20 PROCEDURE — 82570 ASSAY OF URINE CREATININE: CPT

## 2024-05-20 NOTE — OB PROVIDER TRIAGE NOTE - NSHPPHYSICALEXAM_GEN_ALL_CORE
T(C): 36.9 (05-20-24 @ 19:16), Max: 36.9 (05-20-24 @ 19:16)  HR: 88 (05-20-24 @ 19:16) (88 - 88)  BP: 135/98 (05-20-24 @ 19:16) (135/98 - 135/98)  RR: 18 (05-20-24 @ 19:16) (18 - 18)  SpO2: --  Gen: NAD, well-appearing  Pulm: Resting comfortably on room air  Abd: Soft, gravid  Ext: 2+ pitting edema, non-tender     FHT: 125, moderate variability, +accelerations, -decelerations   [place]: none

## 2024-05-20 NOTE — OB PROVIDER TRIAGE NOTE - HISTORY OF PRESENT ILLNESS
ROBBIN PRADO is a 38y  at 36w2d who presents for elevated BPs. She had MFM appt with Dr. Hyatt today with BP of 130-140s/80-90s. She had a previous visit with Dr. Small in April was BP of 130-140s/80-90s. Here in triage, her BPs are 130-150s/80-100s. She denies headaches, visual disturbances, RUQ pain, epigastric pain. She reports her LE edema has gotten worse.  She denies vaginal bleeding, contractions and leakage of fluid. She reports good fetal movement.     Pregnancy course is significant for:  -IVF pregnancy with IM>vaginal progesterone  -AMA    POB: G1 current  PGYN: Denies fibroids, denies cysts, denies STD hx, denies abnormal PAPs  PMH: Denies  PSH: Hysteroscopy   SH: Denies tobacco use, EtOH use and illicit drug use during the pregnancy. Feels safe at home  Meds: PNV, ASA 81/162 alternating  All: NKDA

## 2024-05-20 NOTE — OB PROVIDER TRIAGE NOTE - NSOBPROVIDERNOTE_OBGYN_ALL_OB_FT
A/P: ROBBIN PRADO is a 38y  at 36w2d who presents for elevated BPs, meets criteria for gHTN based on mild range BPs on 2 separate occasions.    -C/w 4h of BP monitoring  -F/u CBC, CMP, PC ratio  -Fetus: NST reactive reassuring  -[place]: none  Dispo: Continue to observe.     Discussed with Dr. Hopkins A/P: ROBBIN PRADO is a 38y  at 36w2d who presents for elevated BPs, meets criteria for gHTN based on mild range BPs on 2 separate occasions.    -C/w 4h of BP monitoring  -F/u CBC, CMP, PC ratio  -Fetus: NST reactive reassuring  -[place]: none  Dispo: Continue to observe.     Discussed with Dr. Hopkins    -S/p 4h of monitoring with normal-mild range BPs  -CBC, CMP wnl. PC <0.2  -Will f/u with MFM on Thursday or Friday and make appt with Dr. Small this week.  -Scheduled for  8AM IOL @37w1d for gHTN  -Patient ordered BP cuff. Told to measure blood pressures once in the AM after waking up and once in the PM before bedtime. Please to call doctor for blood pressures >140/>90s, headaches, blurry vision, spots or bright lights in vision, chest pain, shortness of breath, right upper abdominal pain, worsening lower extremity swelling and present to L&D    Discussed with Dr. Hopkins

## 2024-05-20 NOTE — OB PROVIDER TRIAGE NOTE - ATTENDING COMMENTS
PT seen and examined.  Agree with resident note  pt with BPs in mild range while being observed on L and D for over 4 hours and without any sxs or signs of PEC.  PIH labs are all within normal limits.  Will dc home with PEC precuations.  Pt advised to check her BPs at home and parameters disc.  Pt will come in for induction at 37 weeks this upcoming sunday.    NEO

## 2024-05-22 ENCOUNTER — NON-APPOINTMENT (OUTPATIENT)
Age: 39
End: 2024-05-22

## 2024-05-22 DIAGNOSIS — O09.813 SUPERVISION OF PREGNANCY RESULTING FROM ASSISTED REPRODUCTIVE TECHNOLOGY, THIRD TRIMESTER: ICD-10-CM

## 2024-05-22 DIAGNOSIS — O09.513 SUPERVISION OF ELDERLY PRIMIGRAVIDA, THIRD TRIMESTER: ICD-10-CM

## 2024-05-22 DIAGNOSIS — O13.3 GESTATIONAL [PREGNANCY-INDUCED] HYPERTENSION WITHOUT SIGNIFICANT PROTEINURIA, THIRD TRIMESTER: ICD-10-CM

## 2024-05-22 DIAGNOSIS — Z3A.36 36 WEEKS GESTATION OF PREGNANCY: ICD-10-CM

## 2024-05-23 ENCOUNTER — APPOINTMENT (OUTPATIENT)
Dept: OBGYN | Facility: CLINIC | Age: 39
End: 2024-05-23
Payer: COMMERCIAL

## 2024-05-23 ENCOUNTER — ASOB RESULT (OUTPATIENT)
Age: 39
End: 2024-05-23

## 2024-05-23 ENCOUNTER — INPATIENT (INPATIENT)
Facility: HOSPITAL | Age: 39
LOS: 5 days | Discharge: HOME | End: 2024-05-29
Attending: OBSTETRICS & GYNECOLOGY | Admitting: OBSTETRICS & GYNECOLOGY
Payer: COMMERCIAL

## 2024-05-23 ENCOUNTER — APPOINTMENT (OUTPATIENT)
Dept: ANTEPARTUM | Facility: CLINIC | Age: 39
End: 2024-05-23
Payer: COMMERCIAL

## 2024-05-23 VITALS
HEART RATE: 92 BPM | DIASTOLIC BLOOD PRESSURE: 94 MMHG | SYSTOLIC BLOOD PRESSURE: 138 MMHG | TEMPERATURE: 98 F | RESPIRATION RATE: 16 BRPM

## 2024-05-23 VITALS — DIASTOLIC BLOOD PRESSURE: 98 MMHG | SYSTOLIC BLOOD PRESSURE: 162 MMHG

## 2024-05-23 VITALS
BODY MASS INDEX: 33.05 KG/M2 | DIASTOLIC BLOOD PRESSURE: 106 MMHG | SYSTOLIC BLOOD PRESSURE: 154 MMHG | WEIGHT: 198.38 LBS | HEIGHT: 65 IN

## 2024-05-23 VITALS — SYSTOLIC BLOOD PRESSURE: 140 MMHG | DIASTOLIC BLOOD PRESSURE: 98 MMHG

## 2024-05-23 DIAGNOSIS — O26.899 OTHER SPECIFIED PREGNANCY RELATED CONDITIONS, UNSPECIFIED TRIMESTER: ICD-10-CM

## 2024-05-23 DIAGNOSIS — Z3A.00 WEEKS OF GESTATION OF PREGNANCY NOT SPECIFIED: ICD-10-CM

## 2024-05-23 LAB
ALBUMIN SERPL ELPH-MCNC: 2.5 G/DL — LOW (ref 3.3–5)
ALP SERPL-CCNC: 159 U/L — HIGH (ref 40–120)
ALT FLD-CCNC: 21 U/L — SIGNIFICANT CHANGE UP (ref 12–78)
ANION GAP SERPL CALC-SCNC: 6 MMOL/L — SIGNIFICANT CHANGE UP (ref 5–17)
APPEARANCE UR: CLEAR — SIGNIFICANT CHANGE UP
APTT BLD: 25.7 SEC — SIGNIFICANT CHANGE UP (ref 24.5–35.6)
AST SERPL-CCNC: 16 U/L — SIGNIFICANT CHANGE UP (ref 15–37)
BASOPHILS # BLD AUTO: 0.01 K/UL — SIGNIFICANT CHANGE UP (ref 0–0.2)
BASOPHILS NFR BLD AUTO: 0.1 % — SIGNIFICANT CHANGE UP (ref 0–2)
BILIRUB SERPL-MCNC: 0.2 MG/DL — SIGNIFICANT CHANGE UP (ref 0.2–1.2)
BILIRUB UR QL STRIP: NORMAL
BILIRUB UR-MCNC: NEGATIVE — SIGNIFICANT CHANGE UP
BLD GP AB SCN SERPL QL: SIGNIFICANT CHANGE UP
BUN SERPL-MCNC: 12 MG/DL — SIGNIFICANT CHANGE UP (ref 7–23)
CALCIUM SERPL-MCNC: 9.2 MG/DL — SIGNIFICANT CHANGE UP (ref 8.5–10.1)
CHLORIDE SERPL-SCNC: 111 MMOL/L — HIGH (ref 96–108)
CLARITY UR: CLEAR
CO2 SERPL-SCNC: 23 MMOL/L — SIGNIFICANT CHANGE UP (ref 22–31)
COLLECTION METHOD: NORMAL
COLOR SPEC: YELLOW — SIGNIFICANT CHANGE UP
CREAT ?TM UR-MCNC: <13 MG/DL — SIGNIFICANT CHANGE UP
CREAT SERPL-MCNC: 0.71 MG/DL — SIGNIFICANT CHANGE UP (ref 0.5–1.3)
DIFF PNL FLD: NEGATIVE — SIGNIFICANT CHANGE UP
EGFR: 110 ML/MIN/1.73M2 — SIGNIFICANT CHANGE UP
EOSINOPHIL # BLD AUTO: 0.21 K/UL — SIGNIFICANT CHANGE UP (ref 0–0.5)
EOSINOPHIL NFR BLD AUTO: 2.6 % — SIGNIFICANT CHANGE UP (ref 0–6)
FIBRINOGEN PPP-MCNC: 442 MG/DL — HIGH (ref 200–435)
GLUCOSE BLDC GLUCOMTR-MCNC: 124 MG/DL — HIGH (ref 70–99)
GLUCOSE SERPL-MCNC: 102 MG/DL — HIGH (ref 70–99)
GLUCOSE UR QL: NEGATIVE MG/DL — SIGNIFICANT CHANGE UP
GLUCOSE UR-MCNC: NORMAL
HCG UR QL: 0.2 EU/DL
HCT VFR BLD CALC: 36.2 % — SIGNIFICANT CHANGE UP (ref 34.5–45)
HGB BLD-MCNC: 12.1 G/DL — SIGNIFICANT CHANGE UP (ref 11.5–15.5)
HGB UR QL STRIP.AUTO: NORMAL
IMM GRANULOCYTES NFR BLD AUTO: 1 % — HIGH (ref 0–0.9)
INR BLD: 0.85 RATIO — SIGNIFICANT CHANGE UP (ref 0.85–1.18)
KETONES UR-MCNC: 15 MG/DL
KETONES UR-MCNC: NORMAL
LDH SERPL L TO P-CCNC: 208 U/L — SIGNIFICANT CHANGE UP (ref 84–241)
LEUKOCYTE ESTERASE UR QL STRIP: NORMAL
LEUKOCYTE ESTERASE UR-ACNC: NEGATIVE — SIGNIFICANT CHANGE UP
LYMPHOCYTES # BLD AUTO: 1.83 K/UL — SIGNIFICANT CHANGE UP (ref 1–3.3)
LYMPHOCYTES # BLD AUTO: 22.7 % — SIGNIFICANT CHANGE UP (ref 13–44)
MAGNESIUM SERPL-MCNC: 4.8 MG/DL — HIGH (ref 1.6–2.6)
MCHC RBC-ENTMCNC: 30.6 PG — SIGNIFICANT CHANGE UP (ref 27–34)
MCHC RBC-ENTMCNC: 33.4 GM/DL — SIGNIFICANT CHANGE UP (ref 32–36)
MCV RBC AUTO: 91.4 FL — SIGNIFICANT CHANGE UP (ref 80–100)
MONOCYTES # BLD AUTO: 0.53 K/UL — SIGNIFICANT CHANGE UP (ref 0–0.9)
MONOCYTES NFR BLD AUTO: 6.6 % — SIGNIFICANT CHANGE UP (ref 2–14)
NEUTROPHILS # BLD AUTO: 5.41 K/UL — SIGNIFICANT CHANGE UP (ref 1.8–7.4)
NEUTROPHILS NFR BLD AUTO: 67 % — SIGNIFICANT CHANGE UP (ref 43–77)
NITRITE UR QL STRIP: NORMAL
NITRITE UR-MCNC: NEGATIVE — SIGNIFICANT CHANGE UP
PH UR STRIP: 7
PH UR: 6 — SIGNIFICANT CHANGE UP (ref 5–8)
PLATELET # BLD AUTO: 230 K/UL — SIGNIFICANT CHANGE UP (ref 150–400)
POTASSIUM SERPL-MCNC: 4.5 MMOL/L — SIGNIFICANT CHANGE UP (ref 3.5–5.3)
POTASSIUM SERPL-SCNC: 4.5 MMOL/L — SIGNIFICANT CHANGE UP (ref 3.5–5.3)
PROT ?TM UR-MCNC: <5 MG/DL — SIGNIFICANT CHANGE UP (ref 0–12)
PROT SERPL-MCNC: 6.3 GM/DL — SIGNIFICANT CHANGE UP (ref 6–8.3)
PROT UR STRIP-MCNC: NORMAL
PROT UR-MCNC: NEGATIVE MG/DL — SIGNIFICANT CHANGE UP
PROT/CREAT UR-RTO: SIGNIFICANT CHANGE UP RATIO (ref 0–0.2)
PROTHROM AB SERPL-ACNC: 9.6 SEC — SIGNIFICANT CHANGE UP (ref 9.5–13)
RBC # BLD: 3.96 M/UL — SIGNIFICANT CHANGE UP (ref 3.8–5.2)
RBC # FLD: 13.1 % — SIGNIFICANT CHANGE UP (ref 10.3–14.5)
SODIUM SERPL-SCNC: 140 MMOL/L — SIGNIFICANT CHANGE UP (ref 135–145)
SP GR SPEC: 1.01 — SIGNIFICANT CHANGE UP (ref 1–1.03)
SP GR UR STRIP: 1.01
URATE SERPL-MCNC: 5.8 MG/DL — SIGNIFICANT CHANGE UP (ref 2.5–7)
UROBILINOGEN FLD QL: 0.2 MG/DL — SIGNIFICANT CHANGE UP (ref 0.2–1)
WBC # BLD: 8.07 K/UL — SIGNIFICANT CHANGE UP (ref 3.8–10.5)
WBC # FLD AUTO: 8.07 K/UL — SIGNIFICANT CHANGE UP (ref 3.8–10.5)

## 2024-05-23 PROCEDURE — 83735 ASSAY OF MAGNESIUM: CPT

## 2024-05-23 PROCEDURE — 82570 ASSAY OF URINE CREATININE: CPT

## 2024-05-23 PROCEDURE — 86923 COMPATIBILITY TEST ELECTRIC: CPT

## 2024-05-23 PROCEDURE — 83605 ASSAY OF LACTIC ACID: CPT

## 2024-05-23 PROCEDURE — 83615 LACTATE (LD) (LDH) ENZYME: CPT

## 2024-05-23 PROCEDURE — 0502F SUBSEQUENT PRENATAL CARE: CPT

## 2024-05-23 PROCEDURE — 85027 COMPLETE CBC AUTOMATED: CPT

## 2024-05-23 PROCEDURE — 87040 BLOOD CULTURE FOR BACTERIA: CPT

## 2024-05-23 PROCEDURE — C1889: CPT

## 2024-05-23 PROCEDURE — 85730 THROMBOPLASTIN TIME PARTIAL: CPT

## 2024-05-23 PROCEDURE — 88307 TISSUE EXAM BY PATHOLOGIST: CPT

## 2024-05-23 PROCEDURE — 84156 ASSAY OF PROTEIN URINE: CPT

## 2024-05-23 PROCEDURE — 76818 FETAL BIOPHYS PROFILE W/NST: CPT

## 2024-05-23 PROCEDURE — 84550 ASSAY OF BLOOD/URIC ACID: CPT

## 2024-05-23 PROCEDURE — 59050 FETAL MONITOR W/REPORT: CPT

## 2024-05-23 PROCEDURE — 94760 N-INVAS EAR/PLS OXIMETRY 1: CPT

## 2024-05-23 PROCEDURE — 36415 COLL VENOUS BLD VENIPUNCTURE: CPT

## 2024-05-23 PROCEDURE — 81003 URINALYSIS AUTO W/O SCOPE: CPT | Mod: NC,QW

## 2024-05-23 PROCEDURE — 59400 OBSTETRICAL CARE: CPT

## 2024-05-23 PROCEDURE — 85610 PROTHROMBIN TIME: CPT

## 2024-05-23 PROCEDURE — ZZZZZ: CPT

## 2024-05-23 PROCEDURE — 80053 COMPREHEN METABOLIC PANEL: CPT

## 2024-05-23 PROCEDURE — 85384 FIBRINOGEN ACTIVITY: CPT

## 2024-05-23 PROCEDURE — 81003 URINALYSIS AUTO W/O SCOPE: CPT

## 2024-05-23 PROCEDURE — 81001 URINALYSIS AUTO W/SCOPE: CPT

## 2024-05-23 PROCEDURE — 99214 OFFICE O/P EST MOD 30 MIN: CPT

## 2024-05-23 PROCEDURE — 82962 GLUCOSE BLOOD TEST: CPT

## 2024-05-23 PROCEDURE — 85025 COMPLETE CBC W/AUTO DIFF WBC: CPT

## 2024-05-23 RX ORDER — DIPHENHYDRAMINE HCL 50 MG
25 CAPSULE ORAL ONCE
Refills: 0 | Status: COMPLETED | OUTPATIENT
Start: 2024-05-23 | End: 2024-05-23

## 2024-05-23 RX ORDER — SODIUM CHLORIDE 9 MG/ML
1000 INJECTION, SOLUTION INTRAVENOUS
Refills: 0 | Status: DISCONTINUED | OUTPATIENT
Start: 2024-05-23 | End: 2024-05-26

## 2024-05-23 RX ORDER — OXYTOCIN 10 UNIT/ML
333.33 VIAL (ML) INJECTION
Qty: 20 | Refills: 0 | Status: COMPLETED | OUTPATIENT
Start: 2024-05-23 | End: 2024-05-23

## 2024-05-23 RX ORDER — LABETALOL HCL 100 MG
20 TABLET ORAL ONCE
Refills: 0 | Status: COMPLETED | OUTPATIENT
Start: 2024-05-23 | End: 2024-05-23

## 2024-05-23 RX ORDER — MAGNESIUM SULFATE 500 MG/ML
2 VIAL (ML) INJECTION
Qty: 40 | Refills: 0 | Status: DISCONTINUED | OUTPATIENT
Start: 2024-05-23 | End: 2024-05-25

## 2024-05-23 RX ORDER — DIPHENHYDRAMINE HCL 50 MG
50 CAPSULE ORAL ONCE
Refills: 0 | Status: DISCONTINUED | OUTPATIENT
Start: 2024-05-23 | End: 2024-05-23

## 2024-05-23 RX ORDER — CHLORHEXIDINE GLUCONATE 213 G/1000ML
1 SOLUTION TOPICAL DAILY
Refills: 0 | Status: DISCONTINUED | OUTPATIENT
Start: 2024-05-23 | End: 2024-05-26

## 2024-05-23 RX ORDER — MAGNESIUM SULFATE 500 MG/ML
4 VIAL (ML) INJECTION ONCE
Refills: 0 | Status: COMPLETED | OUTPATIENT
Start: 2024-05-23 | End: 2024-05-23

## 2024-05-23 RX ORDER — CITRIC ACID/SODIUM CITRATE 300-500 MG
15 SOLUTION, ORAL ORAL EVERY 6 HOURS
Refills: 0 | Status: DISCONTINUED | OUTPATIENT
Start: 2024-05-23 | End: 2024-05-26

## 2024-05-23 RX ADMIN — SODIUM CHLORIDE 125 MILLILITER(S): 9 INJECTION, SOLUTION INTRAVENOUS at 15:49

## 2024-05-23 RX ADMIN — Medication 20 MILLIGRAM(S): at 14:45

## 2024-05-23 RX ADMIN — Medication 300 GRAM(S): at 15:45

## 2024-05-23 RX ADMIN — Medication 25 MILLIGRAM(S): at 22:09

## 2024-05-23 RX ADMIN — Medication 50 GM/HR: at 16:07

## 2024-05-23 NOTE — OB PROVIDER H&P - NSLOWPPHRISK_OBGYN_A_OB
No previous uterine incision/Amado Pregnancy/Less than or equal to 4 previous vaginal births/No known bleeding disorder/No history of postpartum hemorrhage/No other PPH risks indicated

## 2024-05-23 NOTE — OB PROVIDER H&P - CURRENT PREGNANCY COMPLICATIONS, OB PROFILE
IVF (embyro transfer 9/28/23), GDMA1 (fasting 70's-80's, 1 hr pp 110's), obesity BMI 32.56, AMA/Hypertensive Disorder/Other

## 2024-05-23 NOTE — OB PROVIDER TRIAGE NOTE - NSOBPROVIDERNOTE_OBGYN_ALL_OB_FT
A/P: 39y/o  @ 36w5d (KAYCEE 2024) with known gHTN presents for one isolated severe range BP in the M office.  - BP monitoring  - PIH labs pending  - Continuous EFM/toco A/P: 37y/o  @ 36w5d (KAYCEE 2024) with known gHTN presents for one isolated severe range BP in the MFM office. Currently mild range, asymptomatic. r/o PEC  - BP monitoring  - PIH labs pending  - Continuous EFM/toco

## 2024-05-23 NOTE — OB PROVIDER TRIAGE NOTE - HISTORY OF PRESENT ILLNESS
39y/o  @ 36w5d (KAYCEE 2024) with known gHTN presents for one isolated severe range BP in the [facility] office. Patient states her BP's at home have been 130's-140/90's and currently denies HA, visual changes, N/V, CP, SOB, RUQ pain, VB, LOF, ctx. +FM. GBS negative . EFW  2791g 6#2 41st%ile .    PNC: IVF (embyro transfer 23), GDMA1 (fasting 70's-80's, 1 hr pp 110's)  ObHx: Primigravida   GynHx: Denies hx of fibroids, ovarian cysts, abnml PAP smears, STDs  MedHx: Denies hx of HTN, DM, asthma, thyroid problems, blood clots/bleeding problems, hx of blood transfusions  Meds: PNV  All: NKDA  PSHx: Denies  FHx: Denies hx of blood clots/bleeding problems  Social: Denies alcohol/tobacco/drug use in pregnancy  Psych: Denies hx of anxiety/depression   39y/o  @ 36w5d (KAYCEE 2024) with known gHTN presents for one isolated severe range BP in the [facility] office. Last seen in triage on  with PIH WNL (p/c 0.2), asymptomatic and planned for an IOL on  at 37w0d gestation. Patient states her BP's at home have been 130's-140/90's and currently denies HA, visual changes, N/V, CP, SOB, RUQ pain, VB, LOF, ctx. +FM. GBS negative. EFW  2791g 6#2 41st%ile .    PNC: IVF (embyro transfer 23), GDMA1 (fasting 70's-80's, 1 hr pp 110's), obesity BMI 32.56, AMA    ObHx: Primigravida   GynHx: Denies hx of fibroids, ovarian cysts, abnml PAP smears, STDs  MedHx: Denies hx of HTN, DM, asthma, thyroid problems, blood clots/bleeding problems, hx of blood transfusions  Meds: PNV  All: NKDA  PSHx: Denies  FHx: Denies hx of blood clots/bleeding problems  Social: Denies alcohol/tobacco/drug use in pregnancy  Psych: Denies hx of anxiety/depression

## 2024-05-23 NOTE — OB RN PATIENT PROFILE - FALL HARM RISK - UNIVERSAL INTERVENTIONS
Bed in lowest position, wheels locked, appropriate side rails in place/Call bell, personal items and telephone in reach/Instruct patient to call for assistance before getting out of bed or chair/Non-slip footwear when patient is out of bed/[place] to call system/Physically safe environment - no spills, clutter or unnecessary equipment/Purposeful Proactive Rounding/Room/bathroom lighting operational, light cord in reach

## 2024-05-23 NOTE — OB RN PATIENT PROFILE - PRO PRENATAL LABS ORI SOURCE BLOOD TYPE
Patient would like to get her colonoscopy from 10/16/17.  Ok to leave a message.  She would also like to receive a letter with her results.  Phone [phone].        hard copy, drawn during this pregnancy

## 2024-05-23 NOTE — OB RN TRIAGE NOTE - CHIEF COMPLAINT QUOTE
BPs have increased the past week. Pt here on Monday for r/o PEC. Pt seen Dr. Small and Dr. Hyatt at office's today for routine visits. BPs 154/106, 140/98 and 162/98. Pt denies headache, blurry vision, n/v, epigastric pain.

## 2024-05-23 NOTE — OB PROVIDER H&P - NSHPPHYSICALEXAM_GEN_ALL_CORE
Gen: NAD  Abd: soft non tender gravid  Extrem: no calf tenderness  SVE:   EFM: 130 moderate variability + accels no decels  [place]: no ctx's  Sono: vertex presentation

## 2024-05-23 NOTE — OB RN TRIAGE NOTE - NS_ACCOMPAINEDBY_OBGYN_ALL_OB
Medicare Wellness Visit  Plan for Preventive Care    A good way for you to stay healthy is to use preventive care.  Medicare covers many services that can help you stay healthy.* The goal of these services is to find any health problems as quickly as possible. Finding problems early can help make them easier to treat.  Your personal plan below lists the services you may need and when they are due.     Health Maintenance Summary     Shingles Vaccine (1 of 2)  Overdue since 9/21/2002    Pneumococcal Vaccine 65+ (2 of 2 - PPSV23)  Overdue since 3/13/2019    Influenza Vaccine (1)  Overdue since 9/1/2019    Medicare Wellness 65+ (Yearly)  Next due on 11/20/2020    Colorectal Cancer Screening-Colonoscopy (Every 5 Years)  Next due on 10/4/2021    DTaP/Tdap/Td Vaccine (2 - Td)  Next due on 5/1/2027    Hepatitis C Screening   Completed    Meningococcal Vaccine   Aged Out           Preventive Care for Women and Men    Heart Screenings (Cardiovascular):  · Blood tests are used to check your cholesterol, lipid and triglyceride levels. High levels can increase your risk for heart disease and stroke. High levels can be treated with medications, diet and exercise. Lowering your levels can help keep your heart and blood vessels healthy.  Your provider will order these tests if they are needed.    · An ultrasound is done to see if you have an abdominal aortic aneurysm (AAA).  This is an enlargement of one of the main blood vessels that delivers blood to the body.   In the [place], 9,000 deaths are caused by AAA.  You may not even know you have this problem and as many as 1 in 3 people will have a serious problem if it is not treated.  Early diagnosis allows for more effective treatment and cure.  If you have a family history of AAA or are a male age 65-75 who has smoked, you are at higher risk of an AAA.  Your provider can order this test, if needed.    Colorectal Screening:  · There are many tests that are used to check for  cancer of your colon and rectum. You and your provider should discuss what test is best for you and when to have it done.  Options include:  · Screening Colonoscopy: exam of the entire colon, seen through a flexible lighted tube.  · Flexible Sigmoidoscopy: exam of the last third (sigmoid portion) of the colon and rectum, seen through a flexible lighted tube.  · Cologuard DNA stool test: a sample of your stool is used to screen for cancer and unseen blood in your stool.  · Fecal Occult Blood Test: a sample of your stool is studied to find any unseen blood    Flu Shot:  · An immunization that helps to prevent influenza (the flu). You should get this every year. The best time to get the shot is in the fall.    Pneumococcal Shot:  • Vaccines are available that can help prevent pneumococcal disease, which is any type of infection caused by Streptococcus pneumoniae bacteria.   Their use can prevent some cases of pneumonia, meningitis, and sepsis. There are two types of pneumococcal vaccines:   o Conjugate vaccines (PCV-13 or Prevnar 13®) - helps protect against the 13 types of pneumococcal bacteria that are the most common causes of serious infections in children and adults.    o Polysaccharide vaccine (PPSV23 or Ahghagbhm29®) - helps protect against 23 types of pneumococcal bacteria for patients who are recommended to get it.  These vaccines should be given at least 12 months apart.  A booster is usually not needed.     Hepatitis B Shot:  · An immunization that helps to protect people from getting Hepatitis B. Hepatitis B is a virus that spreads through contact with infected blood or body fluids. Many people with the virus do not have symptoms.  The virus can lead to serious problems, such as liver disease. Some people are at higher risk than others. Your doctor will tell you if you need this shot.     Diabetes Screening:  · A test to measure sugar (glucose) in your blood is called a fasting blood sugar. Fasting means  you cannot have food or drink for at least 8 hours before the test. This test can detect diabetes long before you may notice symptoms.    Glaucoma Screening:  · Glaucoma screening is performed by your eye doctor. The test measures the fluid pressure inside your eyes to determine if you have glaucoma.     Hepatitis C Screening:  · A blood test to see if you have the hepatitis C virus.  Hepatitis C attacks the liver and is a major cause of chronic liver disease.  Medicare will cover a single screening for all adults born between 1945 & 1965, or high risk patients (people who have injected illegal drugs or people who have had blood transfusions).  High risk patients who continue to inject illegal drugs can be screened for Hepatitis C every year.    Smoking and Tobacco-Use Cessation Counseling:  · Tobacco is the single greatest cause of disease and early death in our country today. Medication and counseling together can increase a person’s chance of quitting for good.   · Medicare covers two quitting attempts per year, with four counseling sessions per attempt (eight sessions in a 12 month period)    Preventive Screening tests for Women    Screening Mammograms and Breast Exams:  · An x-ray of your breasts to check for breast cancer before you or your doctor may be able to feel it.  If breast cancer is found early it can usually be treated with success.    Pelvic Exams and Pap Tests:  · An exam to check for cervical and vaginal cancer. A Pap test is a lab test in which cells are taken from your cervix and sent to the lab to look for signs of cervical cancer. If cancer of the cervix is found early, chances for a cure are good. Testing can generally end at age 65, or if a woman has a hysterectomy for a benign condition. Your provider may recommend more frequent testing if certain abnormal results are found.    Bone Mass Measurements:  · A painless x-ray of your bone density to see if you are at risk for a broken bone. Bone  density refers to the thickness of bones or how tightly the bone tissue is packed.    Preventive Screening tests for Men    Prostate Screening:  · Should you have a prostate cancer test (PSA)?  It is up to you to decide if you want a prostate cancer test. Talk to your clinician to find out if the test is right for you.  Things for you to consider and talk about should include:  · Benefits and harms of the test  · Your family history  · How your race/ethnicity may influence the test  · If the test may impact other medical conditions you have  · Your values on screenings and treatments    *Medicare pays for many preventive services to keep you healthy. For some of these services, you might have to pay a deductible, coinsurance, and / or copayment.  The amounts vary depending on the type of services you need and the kind of Medicare health plan you have.              Monthly self scrotal exam  Schedule lab:> 11/22Same other usual meds  DIET : low sodium, low cholesterol, gerd  Exercise:Walk,bike  See DDS  Stool test:obtained  Colonoscopy:2026  Specialty follow up:sleep clinic  Vaccines:Risk vs benefit discussed.  Pneumonia 23 , flu vax today  You declined new shingles vax  Follow up Return in about 1 year (around 11/20/2020) for Medicare Wellness visit;   SCHEDULE LAB > 11/21,   f/u in 6 months.  mwv/physical 12 months  Consider CT heart Ca++ score screen: 288-8000, $50  See EYE  Consider change saw palmeto to superbeta prostatre, otc  psa 5/2020  Keep with wood work art    Patient Education     Tips to Control Acid Reflux    To control acid reflux, you’ll need to make some basic diet and lifestyle changes. The simple steps outlined below may be all you’ll need to ease discomfort.  Watch what you eat  · Avoid fatty foods and spicy foods.  · Eat fewer acidic foods, such as citrus and tomato-based foods. These can increase symptoms.  · Limit drinking alcohol, caffeine, and fizzy beverages. All increase acid  reflux.  · Try limiting chocolate, peppermint, and spearmint. These can worsen acid reflux in some people.  Watch when you eat  · Avoid lying down for 3 hours after eating.  · Do not snack before going to bed.  Raise your head  Raising your head and upper body by 4 to 6 inches helps limit reflux when you’re lying down. Put blocks under the head of your bed frame to raise it.  Other changes  · Lose weight, if you need to  · Don’t exercise near bedtime  · Avoid tight-fitting clothes  · Limit aspirin and ibuprofen  · Stop smoking   Date Last Reviewed: 7/1/2016  © 2711-2142 [employer]. [address] [place] [address], [place] [address]. All rights reserved. This information is not intended as a substitute for professional medical care. Always follow your healthcare professional's instructions.           Patient Education     Low-Salt Diet  This diet removes foods that are high in salt. It also limits the amount of salt you use when cooking. It is most often used for people with high blood pressure, edema (fluid retention), and kidney, liver, or heart disease.  Table salt contains the mineral sodium. Your body needs sodium to work normally. But too much sodium can make your health problems worse. Your healthcare provider is recommending a low-salt (also called low-sodium) diet for you. Your total daily allowance of salt is 1,500 to 2,300 milligrams (mg). It is less than 1 teaspoon of table salt. This means you can have only about 500 to 700 mg of sodium at each meal. People with certain health problems should limit salt intake to the lower end of the recommended range.    When you cook, don’t add much salt. If you can cook without using salt, even better. Don’t add salt to your food at the table.  When shopping, read food labels. Salt is often called sodium on the label. Choose foods that are salt-free, low salt, or very low salt. Note that foods with reduced salt may not lower your salt intake enough.    Beans,  potatoes, and pasta  Ok: Dry beans, split peas, lentils, potatoes, rice, macaroni, pasta, spaghetti without added salt  Avoid: Potato chips, tortilla chips, and similar products  Breads and cereals  Ok: Low-sodium breads, rolls, cereals, and cakes; low-salt crackers, matzo crackers  Avoid: Salted crackers, pretzels, popcorn, German toast, pancakes, muffins  Dairy  Ok: Milk, chocolate milk, hot chocolate mix, low-salt cheeses, and yogurt  Avoid: Processed cheese and cheese spreads; Roquefort, Camembert, and cottage cheese; buttermilk, instant breakfast drink  Desserts  Ok: Ice cream, frozen yogurt, juice bars, gelatin, cookies and pies, sugar, honey, jelly, hard candy  Avoid: Most pies, cakes and cookies prepared or processed with salt; instant pudding  Drinks  Ok: Tea, coffee, fizzy (carbonated) drinks, juices  Avoid: Flavored coffees, electrolyte replacement drinks, sports drinks  Meats  Ok: All fresh meat, fish, poultry, low-salt tuna, eggs, egg substitute  Avoid: Smoked, pickled, brine-cured, or salted meats and fish. This includes tucker, chipped beef, corned beef, hot dogs, deli meats, ham, kosher meats, salt pork, sausage, canned tuna, salted codfish, smoked salmon, herring, sardines, or anchovies.  Seasonings and spices  Ok: Most seasonings are okay. Good substitutes for salt include: fresh herb blends, hot sauce, lemon, garlic, cardozo, vinegar, dry mustard, parsley, cilantro, horseradish, tomato paste, regular margarine, mayonnaise, unsalted butter, cream cheese, vegetable oil, cream, low-salt salad dressing and gravy.  Avoid: Regular ketchup, relishes, pickles, soy sauce, teriyaki sauce, Worcestershire sauce, BBQ sauce, tartar sauce, meat tenderizer, chili sauce, regular gravy, regular salad dressing, salted butter  Soups  Ok: Low-salt soups and broths made with allowed foods  Avoid: Bouillon cubes, soups with smoked or salted meats, regular soup and broth  Vegetables  Ok: Most vegetables are okay; also  low-salt tomato and vegetable juices  Avoid: Sauerkraut and other brine-soaked vegetables; pickles and other pickled vegetables; tomato juice, olives  Date Last Reviewed: 8/1/2016 © 2000-2018 [employer]. [address] [place] [id number]. All rights reserved. This information is not intended as a substitute for professional medical care. Always follow your healthcare professional's instructions.           Patient Education     Low-Cholesterol Diet  Your body needs cholesterol to build new cells and create certain hormones. There are 2 kinds of cholesterol in your blood:     · HDL (“good”) cholesterol. This prevents fat deposits (plaque) from building up in your arteries. In this way it protects against heart disease and stroke.  · LDL (“bad”) cholesterol. This stays in your body and sticks to artery walls. Over time it may block blood flow to the heart and brain. This can cause a heart attack or stroke.  The cholesterol in your blood comes from 2 sources: cholesterol in food that you eat and cholesterol that your liver makes. You should limit the amount of cholesterol in your diet. But the cholesterol that your body makes has the greatest disease risk. And your body makes more cholesterol when your diet is high in bad fats (saturated and trans fats). There are 2 kinds of fats you can eat:  · Good fats, or unsaturated fats (mono-unsaturated and poly-unsaturated). They raise the level of good cholesterol and lower the level of bad cholesterol. Good fats are found in vegetable oils such as olive, sunflower, corn, and soybean oils, and in nuts and seeds.  · Bad fats, or saturated fats (including foods high in cholesterol) and trans fats. These raise your risk of disease. They lower the good cholesterol and raise the level of bad cholesterol. Bad fats are found in animal products, including meat, whole-milk dairy products, and butter. Some plants are also high in bad fats (coconut and palm  plants). Trans fats are found in hard (stick) margarines. They are also in many fast foods and commercially baked goods. Soft margarine sold in tubs has fewer trans fats and is safer to use.  High blood cholesterol is usually due to a diet high in saturated fat, along with not being physically active. In some cases, genetics plays a role in causing high cholesterol. The tips below will help you create healthy eating habits that will help lower your blood cholesterol level.  Create a diet high in good fats, low in bad fats (and low in cholesterol)  The following steps will help you create a diet high in good fats and low in bad fats:  · Talk with your doctor before starting a low cholesterol diet or weight loss program.  · Learn to read nutrition labels and select appropriate portion sizes.  · When cooking, use plant-based unsaturated vegetable oils (sunflower, corn, soybean, canola, peanut, and olive oils).  · Avoid saturated fats found in animal products such as meat, dairy (whole-milk, cheese and ice cream), poultry skin, and egg yolks. Plants high in saturated oils include coconut oil, palm oil, and palm kernel oil.  · If you eat meat, choose smaller portions and lean cuts, such as round, karen, sirloin, or loin. Eat more meatless meals.  · Replace meat with fish at least 2 times a week. Fish is an important source of the unsaturated fat called omega-3 fatty acids. This fat has potential to lower the risk of heart disease.  · Replace whole-milk dairy products with low-fat or nonfat products. Try soy products. Soy helps to reduce total cholesterol.  · Supplement your diet with protective fibers. Eat nuts, seeds, and whole grains rather than white rice and bread. These foods lower both cholesterol and triglyceride levels. (Triglycerides are another fat found in the blood.) Walnuts are one of the best sources of omega-3 fatty acids.  · Eat plenty of fresh fruits and vegetables daily.  · Avoid fast foods and  commercial baked goods. Assume they contain saturated fat unless labeled otherwise.  Date Last Reviewed: 8/1/2016  © 9309-4333 [employer]. [address] [place] [address], [place] [address]. All rights reserved. This information is not intended as a substitute for professional medical care. Always follow your healthcare professional's instructions.             Medicare Wellness Visit  Plan for Preventive Care    A good way for you to stay healthy is to use preventive care.  Medicare covers many services that can help you stay healthy.* The goal of these services is to find any health problems as quickly as possible. Finding problems early can help make them easier to treat.  Your personal plan below lists the services you may need and when they are due.     Health Maintenance Summary     Shingles Vaccine (1 of 2)  Overdue since 9/21/2002    Medicare Wellness 65+ (Yearly)  Next due on 11/20/2020    Colorectal Cancer Screening-Colonoscopy (Every 5 Years)  Next due on 10/4/2021    DTaP/Tdap/Td Vaccine (2 - Td)  Next due on 5/1/2027    Hepatitis C Screening   Completed    Influenza Vaccine   Completed    Pneumococcal Vaccine 65+   Completed    Meningococcal Vaccine   Aged Out           Preventive Care for Women and Men    Heart Screenings (Cardiovascular):  · Blood tests are used to check your cholesterol, lipid and triglyceride levels. High levels can increase your risk for heart disease and stroke. High levels can be treated with medications, diet and exercise. Lowering your levels can help keep your heart and blood vessels healthy.  Your provider will order these tests if they are needed.    · An ultrasound is done to see if you have an abdominal aortic aneurysm (AAA).  This is an enlargement of one of the main blood vessels that delivers blood to the body.   In the [place], 9,000 deaths are caused by AAA.  You may not even know you have this problem and as many as 1 in 3 people will have a serious  problem if it is not treated.  Early diagnosis allows for more effective treatment and cure.  If you have a family history of AAA or are a male age 65-75 who has smoked, you are at higher risk of an AAA.  Your provider can order this test, if needed.    Colorectal Screening:  · There are many tests that are used to check for cancer of your colon and rectum. You and your provider should discuss what test is best for you and when to have it done.  Options include:  · Screening Colonoscopy: exam of the entire colon, seen through a flexible lighted tube.  · Flexible Sigmoidoscopy: exam of the last third (sigmoid portion) of the colon and rectum, seen through a flexible lighted tube.  · Cologuard DNA stool test: a sample of your stool is used to screen for cancer and unseen blood in your stool.  · Fecal Occult Blood Test: a sample of your stool is studied to find any unseen blood    Flu Shot:  · An immunization that helps to prevent influenza (the flu). You should get this every year. The best time to get the shot is in the fall.    Pneumococcal Shot:  • Vaccines are available that can help prevent pneumococcal disease, which is any type of infection caused by Streptococcus pneumoniae bacteria.   Their use can prevent some cases of pneumonia, meningitis, and sepsis. There are two types of pneumococcal vaccines:   o Conjugate vaccines (PCV-13 or Prevnar 13®) - helps protect against the 13 types of pneumococcal bacteria that are the most common causes of serious infections in children and adults.    o Polysaccharide vaccine (PPSV23 or Yrvpnbkds21®) - helps protect against 23 types of pneumococcal bacteria for patients who are recommended to get it.  These vaccines should be given at least 12 months apart.  A booster is usually not needed.     Hepatitis B Shot:  · An immunization that helps to protect people from getting Hepatitis B. Hepatitis B is a virus that spreads through contact with infected blood or body fluids.  Many people with the virus do not have symptoms.  The virus can lead to serious problems, such as liver disease. Some people are at higher risk than others. Your doctor will tell you if you need this shot.     Diabetes Screening:  · A test to measure sugar (glucose) in your blood is called a fasting blood sugar. Fasting means you cannot have food or drink for at least 8 hours before the test. This test can detect diabetes long before you may notice symptoms.    Glaucoma Screening:  · Glaucoma screening is performed by your eye doctor. The test measures the fluid pressure inside your eyes to determine if you have glaucoma.     Hepatitis C Screening:  · A blood test to see if you have the hepatitis C virus.  Hepatitis C attacks the liver and is a major cause of chronic liver disease.  Medicare will cover a single screening for all adults born between 1945 & 1965, or high risk patients (people who have injected illegal drugs or people who have had blood transfusions).  High risk patients who continue to inject illegal drugs can be screened for Hepatitis C every year.    Smoking and Tobacco-Use Cessation Counseling:  · Tobacco is the single greatest cause of disease and early death in our country today. Medication and counseling together can increase a person’s chance of quitting for good.   · Medicare covers two quitting attempts per year, with four counseling sessions per attempt (eight sessions in a 12 month period)    Preventive Screening tests for Women    Screening Mammograms and Breast Exams:  · An x-ray of your breasts to check for breast cancer before you or your doctor may be able to feel it.  If breast cancer is found early it can usually be treated with success.    Pelvic Exams and Pap Tests:  · An exam to check for cervical and vaginal cancer. A Pap test is a lab test in which cells are taken from your cervix and sent to the lab to look for signs of cervical cancer. If cancer of the cervix is found early,  chances for a cure are good. Testing can generally end at age 65, or if a woman has a hysterectomy for a benign condition. Your provider may recommend more frequent testing if certain abnormal results are found.    Bone Mass Measurements:  · A painless x-ray of your bone density to see if you are at risk for a broken bone. Bone density refers to the thickness of bones or how tightly the bone tissue is packed.    Preventive Screening tests for Men    Prostate Screening:  · Should you have a prostate cancer test (PSA)?  It is up to you to decide if you want a prostate cancer test. Talk to your clinician to find out if the test is right for you.  Things for you to consider and talk about should include:  · Benefits and harms of the test  · Your family history  · How your race/ethnicity may influence the test  · If the test may impact other medical conditions you have  · Your values on screenings and treatments    *Medicare pays for many preventive services to keep you healthy. For some of these services, you might have to pay a deductible, coinsurance, and / or copayment.  The amounts vary depending on the type of services you need and the kind of Medicare health plan you have.               Mother

## 2024-05-23 NOTE — OB PROVIDER H&P - HISTORY OF PRESENT ILLNESS
39y/o  @ 36w5d (AKYCEE 2024) with known gHTN presented to triage for r/o PEC. On arrival patient had severe range BP's requiring 20IVP labetalol. Subsequent admission 2.2 sPEC diagnosis. Patient states her BP's at home have been 130's-140/90's and currently denies HA, visual changes, N/V, CP, SOB, RUQ pain, VB, LOF, ctx. +FM. GBS negative. EFW  2791g 6#2 41st%ile  .    PNC: IVF (embyro transfer 23), GDMA1 (fasting 70's-80's, 1 hr pp 110's), obesity BMI 32.56, AMA    ObHx: Primigravida   GynHx: Denies hx of fibroids, ovarian cysts, abnml PAP smears, STDs  MedHx: Denies hx of HTN, DM, asthma, thyroid problems, blood clots/bleeding problems, hx of blood transfusions  Meds: PNV, ASA  All: NKDA  PSHx: Denies  FHx: Denies hx of blood clots/bleeding problems  Social: Denies alcohol/tobacco/drug use in pregnancy  Psych: Denies hx of anxiety/depression

## 2024-05-23 NOTE — OB PROVIDER TRIAGE NOTE - NSHPPHYSICALEXAM_GEN_ALL_CORE
Gen: NAD  Abd: soft non tender gravid  Extrem: no calf tenderness  SVE: deferred  EFM: 130 moderate variability + accels no decels  toco: no ctx's

## 2024-05-23 NOTE — OB PROVIDER TRIAGE NOTE - CURRENT PREGNANCY COMPLICATIONS, OB PROFILE
Hypertensive Disorder IVF ([facility] transfer 9/28/23), GDMA1 (fasting 70's-80's, 1 hr pp 110's), obesity BMI 32.56, AMA/Hypertensive Disorder/Other

## 2024-05-23 NOTE — OB PROVIDER H&P - ASSESSMENT
A/P: 39y/o  @ 36w5d (KAYCEE 2024) admission 2.2 severe PEC s/p 20IVP. PIH labs WNL.  - Admit to L&D  - Routine labs, PI labs  - BP monitoring  - IVH  - NPO  - Consent  - Continuous EFM/toco  - __ for IOL  - Anesthesia consult  - Anticipate

## 2024-05-23 NOTE — OB RN PATIENT PROFILE - CURRENT PREGNANCY COMPLICATIONS, OB PROFILE
IVF (em[facility] transfer 9/28/23), GDMA1 (fasting 70's-80's, 1 hr pp 110's), obesity BMI 32.56, AMA/Hypertensive Disorder/Other IVF (em[facility] transfer 9/28/23), GDMA1 (fasting 70's-80's, 1 hr pp 110's), obesity BMI 32.56, AMA/Gestational Diabetes/Hypertensive Disorder/Other

## 2024-05-23 NOTE — OB PROVIDER H&P - NS ATTEND AMEND GEN_ALL_CORE FT
Agree with above assessment and plan. Patient admitted for induction of labor secondary to pre-eclampsia with severe features.     Patient seen at bedside.  Consented for vaginal delivery, possible operative vaginal delivery, possible  section, and any other indicated procedures.  The risks, benefits and alternatives of all above procedures were discussed. Questions were encouraged and answered. Written consent was signed.

## 2024-05-24 LAB
GLUCOSE BLDC GLUCOMTR-MCNC: 111 MG/DL — HIGH (ref 70–99)
GLUCOSE BLDC GLUCOMTR-MCNC: 88 MG/DL — SIGNIFICANT CHANGE UP (ref 70–99)
GLUCOSE BLDC GLUCOMTR-MCNC: 95 MG/DL — SIGNIFICANT CHANGE UP (ref 70–99)
GLUCOSE BLDC GLUCOMTR-MCNC: 98 MG/DL — SIGNIFICANT CHANGE UP (ref 70–99)
MAGNESIUM SERPL-MCNC: 5.6 MG/DL — HIGH (ref 1.6–2.6)
MAGNESIUM SERPL-MCNC: 5.9 MG/DL — HIGH (ref 1.6–2.6)
MAGNESIUM SERPL-MCNC: 5.9 MG/DL — HIGH (ref 1.6–2.6)
MAGNESIUM SERPL-MCNC: 6 MG/DL — HIGH (ref 1.6–2.6)
T PALLIDUM AB TITR SER: NEGATIVE — SIGNIFICANT CHANGE UP

## 2024-05-24 RX ORDER — SODIUM CHLORIDE 9 MG/ML
1000 INJECTION, SOLUTION INTRAVENOUS
Refills: 0 | Status: DISCONTINUED | OUTPATIENT
Start: 2024-05-24 | End: 2024-05-29

## 2024-05-24 RX ORDER — DIPHENHYDRAMINE HCL 50 MG
25 CAPSULE ORAL ONCE
Refills: 0 | Status: COMPLETED | OUTPATIENT
Start: 2024-05-24 | End: 2024-05-24

## 2024-05-24 RX ORDER — OXYTOCIN 10 UNIT/ML
2 VIAL (ML) INJECTION
Qty: 30 | Refills: 0 | Status: DISCONTINUED | OUTPATIENT
Start: 2024-05-24 | End: 2024-05-29

## 2024-05-24 RX ORDER — NIFEDIPINE 30 MG
30 TABLET, EXTENDED RELEASE 24 HR ORAL DAILY
Refills: 0 | Status: DISCONTINUED | OUTPATIENT
Start: 2024-05-24 | End: 2024-05-29

## 2024-05-24 RX ORDER — LABETALOL HCL 100 MG
20 TABLET ORAL ONCE
Refills: 0 | Status: COMPLETED | OUTPATIENT
Start: 2024-05-24 | End: 2024-05-24

## 2024-05-24 RX ADMIN — SODIUM CHLORIDE 125 MILLILITER(S): 9 INJECTION, SOLUTION INTRAVENOUS at 05:31

## 2024-05-24 RX ADMIN — Medication 50 GM/HR: at 09:41

## 2024-05-24 RX ADMIN — Medication 30 MILLIGRAM(S): at 22:54

## 2024-05-24 RX ADMIN — Medication 25 MILLIGRAM(S): at 18:28

## 2024-05-24 RX ADMIN — Medication 2 MILLIUNIT(S)/MIN: at 21:43

## 2024-05-24 RX ADMIN — Medication 20 MILLIGRAM(S): at 19:00

## 2024-05-24 RX ADMIN — Medication 2 MILLIUNIT(S)/MIN: at 18:56

## 2024-05-24 NOTE — PROGRESS NOTE ADULT - SUBJECTIVE AND OBJECTIVE BOX
Patient is a  undergoing IOL at 36w5d for preeclampsia with severe features, now s/p 8 doses of cytotec in 24hrs.  SVE: Fingertip/thick/high,  Cooks balloon attempted x 2 unsuccessfully.  BP's with isolated severe and mild range BP's not meeting criteria for IV medication, on Magnesium, most recent level of 6.  Cat I tracing  Discussed with MFM, will start pitocin with reattempt at balloon placement.  JEFE Small MD

## 2024-05-25 LAB
ALBUMIN SERPL ELPH-MCNC: 2.4 G/DL — LOW (ref 3.3–5)
ALP SERPL-CCNC: 168 U/L — HIGH (ref 40–120)
ALT FLD-CCNC: 19 U/L — SIGNIFICANT CHANGE UP (ref 12–78)
ANION GAP SERPL CALC-SCNC: 9 MMOL/L — SIGNIFICANT CHANGE UP (ref 5–17)
APPEARANCE UR: CLEAR — SIGNIFICANT CHANGE UP
AST SERPL-CCNC: 16 U/L — SIGNIFICANT CHANGE UP (ref 15–37)
BACTERIA # UR AUTO: NEGATIVE /HPF — SIGNIFICANT CHANGE UP
BASOPHILS # BLD AUTO: 0.02 K/UL — SIGNIFICANT CHANGE UP (ref 0–0.2)
BASOPHILS NFR BLD AUTO: 0.2 % — SIGNIFICANT CHANGE UP (ref 0–2)
BILIRUB SERPL-MCNC: 0.3 MG/DL — SIGNIFICANT CHANGE UP (ref 0.2–1.2)
BILIRUB UR-MCNC: NEGATIVE — SIGNIFICANT CHANGE UP
BUN SERPL-MCNC: 10 MG/DL — SIGNIFICANT CHANGE UP (ref 7–23)
CALCIUM SERPL-MCNC: 8.1 MG/DL — LOW (ref 8.5–10.1)
CAST: 0 /LPF — SIGNIFICANT CHANGE UP (ref 0–4)
CHLORIDE SERPL-SCNC: 107 MMOL/L — SIGNIFICANT CHANGE UP (ref 96–108)
CO2 SERPL-SCNC: 22 MMOL/L — SIGNIFICANT CHANGE UP (ref 22–31)
COLOR SPEC: YELLOW — SIGNIFICANT CHANGE UP
CREAT ?TM UR-MCNC: 56 MG/DL — SIGNIFICANT CHANGE UP
CREAT SERPL-MCNC: 0.8 MG/DL — SIGNIFICANT CHANGE UP (ref 0.5–1.3)
DIFF PNL FLD: ABNORMAL
EGFR: 95 ML/MIN/1.73M2 — SIGNIFICANT CHANGE UP
EOSINOPHIL # BLD AUTO: 0.39 K/UL — SIGNIFICANT CHANGE UP (ref 0–0.5)
EOSINOPHIL NFR BLD AUTO: 3.4 % — SIGNIFICANT CHANGE UP (ref 0–6)
GLUCOSE BLDC GLUCOMTR-MCNC: 103 MG/DL — HIGH (ref 70–99)
GLUCOSE BLDC GLUCOMTR-MCNC: 79 MG/DL — SIGNIFICANT CHANGE UP (ref 70–99)
GLUCOSE BLDC GLUCOMTR-MCNC: 84 MG/DL — SIGNIFICANT CHANGE UP (ref 70–99)
GLUCOSE BLDC GLUCOMTR-MCNC: 93 MG/DL — SIGNIFICANT CHANGE UP (ref 70–99)
GLUCOSE SERPL-MCNC: 116 MG/DL — HIGH (ref 70–99)
GLUCOSE UR QL: NEGATIVE MG/DL — SIGNIFICANT CHANGE UP
HCT VFR BLD CALC: 36.4 % — SIGNIFICANT CHANGE UP (ref 34.5–45)
HGB BLD-MCNC: 12.3 G/DL — SIGNIFICANT CHANGE UP (ref 11.5–15.5)
IMM GRANULOCYTES NFR BLD AUTO: 0.9 % — SIGNIFICANT CHANGE UP (ref 0–0.9)
KETONES UR-MCNC: NEGATIVE MG/DL — SIGNIFICANT CHANGE UP
LDH SERPL L TO P-CCNC: 194 U/L — SIGNIFICANT CHANGE UP (ref 84–241)
LEUKOCYTE ESTERASE UR-ACNC: NEGATIVE — SIGNIFICANT CHANGE UP
LYMPHOCYTES # BLD AUTO: 1.84 K/UL — SIGNIFICANT CHANGE UP (ref 1–3.3)
LYMPHOCYTES # BLD AUTO: 15.8 % — SIGNIFICANT CHANGE UP (ref 13–44)
MAGNESIUM SERPL-MCNC: 5.5 MG/DL — HIGH (ref 1.6–2.6)
MAGNESIUM SERPL-MCNC: 5.9 MG/DL — HIGH (ref 1.6–2.6)
MAGNESIUM SERPL-MCNC: 6.4 MG/DL — HIGH (ref 1.6–2.6)
MAGNESIUM SERPL-MCNC: 7.3 MG/DL — CRITICAL HIGH (ref 1.6–2.6)
MCHC RBC-ENTMCNC: 30.3 PG — SIGNIFICANT CHANGE UP (ref 27–34)
MCHC RBC-ENTMCNC: 33.8 GM/DL — SIGNIFICANT CHANGE UP (ref 32–36)
MCV RBC AUTO: 89.7 FL — SIGNIFICANT CHANGE UP (ref 80–100)
MONOCYTES # BLD AUTO: 0.75 K/UL — SIGNIFICANT CHANGE UP (ref 0–0.9)
MONOCYTES NFR BLD AUTO: 6.5 % — SIGNIFICANT CHANGE UP (ref 2–14)
NEUTROPHILS # BLD AUTO: 8.5 K/UL — HIGH (ref 1.8–7.4)
NEUTROPHILS NFR BLD AUTO: 73.2 % — SIGNIFICANT CHANGE UP (ref 43–77)
NITRITE UR-MCNC: NEGATIVE — SIGNIFICANT CHANGE UP
PH UR: 6.5 — SIGNIFICANT CHANGE UP (ref 5–8)
PLATELET # BLD AUTO: 241 K/UL — SIGNIFICANT CHANGE UP (ref 150–400)
POTASSIUM SERPL-MCNC: 3.7 MMOL/L — SIGNIFICANT CHANGE UP (ref 3.5–5.3)
POTASSIUM SERPL-SCNC: 3.7 MMOL/L — SIGNIFICANT CHANGE UP (ref 3.5–5.3)
PROT ?TM UR-MCNC: 14 MG/DL — HIGH (ref 0–12)
PROT SERPL-MCNC: 6.2 GM/DL — SIGNIFICANT CHANGE UP (ref 6–8.3)
PROT UR-MCNC: NEGATIVE MG/DL — SIGNIFICANT CHANGE UP
PROT/CREAT UR-RTO: 0.2 RATIO — SIGNIFICANT CHANGE UP (ref 0–0.2)
RBC # BLD: 4.06 M/UL — SIGNIFICANT CHANGE UP (ref 3.8–5.2)
RBC # FLD: 13.4 % — SIGNIFICANT CHANGE UP (ref 10.3–14.5)
RBC CASTS # UR COMP ASSIST: 32 /HPF — HIGH (ref 0–4)
SODIUM SERPL-SCNC: 138 MMOL/L — SIGNIFICANT CHANGE UP (ref 135–145)
SP GR SPEC: 1.01 — SIGNIFICANT CHANGE UP (ref 1–1.03)
SQUAMOUS # UR AUTO: 1 /HPF — SIGNIFICANT CHANGE UP (ref 0–5)
URATE SERPL-MCNC: 6.6 MG/DL — SIGNIFICANT CHANGE UP (ref 2.5–7)
UROBILINOGEN FLD QL: 0.2 MG/DL — SIGNIFICANT CHANGE UP (ref 0.2–1)
WBC # BLD: 11.61 K/UL — HIGH (ref 3.8–10.5)
WBC # FLD AUTO: 11.61 K/UL — HIGH (ref 3.8–10.5)
WBC UR QL: 1 /HPF — SIGNIFICANT CHANGE UP (ref 0–5)

## 2024-05-25 RX ORDER — ONDANSETRON 8 MG/1
4 TABLET, FILM COATED ORAL ONCE
Refills: 0 | Status: COMPLETED | OUTPATIENT
Start: 2024-05-25 | End: 2024-05-25

## 2024-05-25 RX ORDER — MAGNESIUM SULFATE 500 MG/ML
1 VIAL (ML) INJECTION
Qty: 40 | Refills: 0 | Status: DISCONTINUED | OUTPATIENT
Start: 2024-05-25 | End: 2024-05-26

## 2024-05-25 RX ADMIN — Medication 30 MILLIGRAM(S): at 23:19

## 2024-05-25 RX ADMIN — Medication 1 APPLICATION(S): at 05:39

## 2024-05-25 RX ADMIN — Medication 15 MILLILITER(S): at 02:49

## 2024-05-25 RX ADMIN — ONDANSETRON 4 MILLIGRAM(S): 8 TABLET, FILM COATED ORAL at 02:42

## 2024-05-26 ENCOUNTER — RESULT REVIEW (OUTPATIENT)
Age: 39
End: 2024-05-26

## 2024-05-26 LAB
ALBUMIN SERPL ELPH-MCNC: 1.8 G/DL — LOW (ref 3.3–5)
ALBUMIN SERPL ELPH-MCNC: 1.8 G/DL — LOW (ref 3.3–5)
ALP SERPL-CCNC: 154 U/L — HIGH (ref 40–120)
ALP SERPL-CCNC: 163 U/L — HIGH (ref 40–120)
ALT FLD-CCNC: 18 U/L — SIGNIFICANT CHANGE UP (ref 12–78)
ALT FLD-CCNC: 20 U/L — SIGNIFICANT CHANGE UP (ref 12–78)
ANION GAP SERPL CALC-SCNC: 10 MMOL/L — SIGNIFICANT CHANGE UP (ref 5–17)
ANION GAP SERPL CALC-SCNC: 9 MMOL/L — SIGNIFICANT CHANGE UP (ref 5–17)
APTT BLD: 24.8 SEC — SIGNIFICANT CHANGE UP (ref 24.5–35.6)
AST SERPL-CCNC: 29 U/L — SIGNIFICANT CHANGE UP (ref 15–37)
AST SERPL-CCNC: 40 U/L — HIGH (ref 15–37)
BASOPHILS # BLD AUTO: 0.05 K/UL — SIGNIFICANT CHANGE UP (ref 0–0.2)
BASOPHILS NFR BLD AUTO: 0.2 % — SIGNIFICANT CHANGE UP (ref 0–2)
BILIRUB SERPL-MCNC: 0.6 MG/DL — SIGNIFICANT CHANGE UP (ref 0.2–1.2)
BILIRUB SERPL-MCNC: 0.8 MG/DL — SIGNIFICANT CHANGE UP (ref 0.2–1.2)
BUN SERPL-MCNC: 11 MG/DL — SIGNIFICANT CHANGE UP (ref 7–23)
BUN SERPL-MCNC: 13 MG/DL — SIGNIFICANT CHANGE UP (ref 7–23)
CALCIUM SERPL-MCNC: 8.1 MG/DL — LOW (ref 8.5–10.1)
CALCIUM SERPL-MCNC: 8.3 MG/DL — LOW (ref 8.5–10.1)
CHLORIDE SERPL-SCNC: 107 MMOL/L — SIGNIFICANT CHANGE UP (ref 96–108)
CHLORIDE SERPL-SCNC: 108 MMOL/L — SIGNIFICANT CHANGE UP (ref 96–108)
CO2 SERPL-SCNC: 18 MMOL/L — LOW (ref 22–31)
CO2 SERPL-SCNC: 21 MMOL/L — LOW (ref 22–31)
CREAT SERPL-MCNC: 1.45 MG/DL — HIGH (ref 0.5–1.3)
CREAT SERPL-MCNC: 1.5 MG/DL — HIGH (ref 0.5–1.3)
EGFR: 45 ML/MIN/1.73M2 — LOW
EGFR: 47 ML/MIN/1.73M2 — LOW
EOSINOPHIL # BLD AUTO: 0.03 K/UL — SIGNIFICANT CHANGE UP (ref 0–0.5)
EOSINOPHIL NFR BLD AUTO: 0.1 % — SIGNIFICANT CHANGE UP (ref 0–6)
FIBRINOGEN PPP-MCNC: 534 MG/DL — HIGH (ref 200–435)
GLUCOSE BLDC GLUCOMTR-MCNC: 108 MG/DL — HIGH (ref 70–99)
GLUCOSE SERPL-MCNC: 120 MG/DL — HIGH (ref 70–99)
GLUCOSE SERPL-MCNC: 132 MG/DL — HIGH (ref 70–99)
HCT VFR BLD CALC: 32.6 % — LOW (ref 34.5–45)
HCT VFR BLD CALC: 36.9 % — SIGNIFICANT CHANGE UP (ref 34.5–45)
HGB BLD-MCNC: 11 G/DL — LOW (ref 11.5–15.5)
HGB BLD-MCNC: 12.2 G/DL — SIGNIFICANT CHANGE UP (ref 11.5–15.5)
IMM GRANULOCYTES NFR BLD AUTO: 1.7 % — HIGH (ref 0–0.9)
INR BLD: 0.82 RATIO — LOW (ref 0.85–1.18)
LACTATE SERPL-SCNC: 1.6 MMOL/L — SIGNIFICANT CHANGE UP (ref 0.7–2)
LACTATE SERPL-SCNC: 2.9 MMOL/L — HIGH (ref 0.7–2)
LACTATE SERPL-SCNC: 3.5 MMOL/L — HIGH (ref 0.7–2)
LACTATE SERPL-SCNC: 4.1 MMOL/L — CRITICAL HIGH (ref 0.7–2)
LYMPHOCYTES # BLD AUTO: 0.6 K/UL — LOW (ref 1–3.3)
LYMPHOCYTES # BLD AUTO: 2.5 % — LOW (ref 13–44)
MAGNESIUM SERPL-MCNC: 2.4 MG/DL — SIGNIFICANT CHANGE UP (ref 1.6–2.6)
MAGNESIUM SERPL-MCNC: 5.2 MG/DL — HIGH (ref 1.6–2.6)
MAGNESIUM SERPL-MCNC: 5.5 MG/DL — HIGH (ref 1.6–2.6)
MAGNESIUM SERPL-MCNC: 5.7 MG/DL — HIGH (ref 1.6–2.6)
MCHC RBC-ENTMCNC: 30.1 PG — SIGNIFICANT CHANGE UP (ref 27–34)
MCHC RBC-ENTMCNC: 30.9 PG — SIGNIFICANT CHANGE UP (ref 27–34)
MCHC RBC-ENTMCNC: 33.1 GM/DL — SIGNIFICANT CHANGE UP (ref 32–36)
MCHC RBC-ENTMCNC: 33.7 GM/DL — SIGNIFICANT CHANGE UP (ref 32–36)
MCV RBC AUTO: 91.1 FL — SIGNIFICANT CHANGE UP (ref 80–100)
MCV RBC AUTO: 91.6 FL — SIGNIFICANT CHANGE UP (ref 80–100)
MONOCYTES # BLD AUTO: 0.82 K/UL — SIGNIFICANT CHANGE UP (ref 0–0.9)
MONOCYTES NFR BLD AUTO: 3.4 % — SIGNIFICANT CHANGE UP (ref 2–14)
NEUTROPHILS # BLD AUTO: 22.26 K/UL — HIGH (ref 1.8–7.4)
NEUTROPHILS NFR BLD AUTO: 92.1 % — HIGH (ref 43–77)
PLATELET # BLD AUTO: 220 K/UL — SIGNIFICANT CHANGE UP (ref 150–400)
PLATELET # BLD AUTO: 228 K/UL — SIGNIFICANT CHANGE UP (ref 150–400)
POTASSIUM SERPL-MCNC: 3.8 MMOL/L — SIGNIFICANT CHANGE UP (ref 3.5–5.3)
POTASSIUM SERPL-MCNC: 4.2 MMOL/L — SIGNIFICANT CHANGE UP (ref 3.5–5.3)
POTASSIUM SERPL-SCNC: 3.8 MMOL/L — SIGNIFICANT CHANGE UP (ref 3.5–5.3)
POTASSIUM SERPL-SCNC: 4.2 MMOL/L — SIGNIFICANT CHANGE UP (ref 3.5–5.3)
PROT SERPL-MCNC: 5.3 GM/DL — LOW (ref 6–8.3)
PROT SERPL-MCNC: 5.3 GM/DL — LOW (ref 6–8.3)
PROTHROM AB SERPL-ACNC: 9.3 SEC — LOW (ref 9.5–13)
RBC # BLD: 3.56 M/UL — LOW (ref 3.8–5.2)
RBC # BLD: 4.05 M/UL — SIGNIFICANT CHANGE UP (ref 3.8–5.2)
RBC # FLD: 13.6 % — SIGNIFICANT CHANGE UP (ref 10.3–14.5)
RBC # FLD: 13.8 % — SIGNIFICANT CHANGE UP (ref 10.3–14.5)
SODIUM SERPL-SCNC: 136 MMOL/L — SIGNIFICANT CHANGE UP (ref 135–145)
SODIUM SERPL-SCNC: 137 MMOL/L — SIGNIFICANT CHANGE UP (ref 135–145)
WBC # BLD: 24.18 K/UL — HIGH (ref 3.8–10.5)
WBC # BLD: 29.17 K/UL — HIGH (ref 3.8–10.5)
WBC # FLD AUTO: 24.18 K/UL — HIGH (ref 3.8–10.5)
WBC # FLD AUTO: 29.17 K/UL — HIGH (ref 3.8–10.5)

## 2024-05-26 PROCEDURE — 88307 TISSUE EXAM BY PATHOLOGIST: CPT | Mod: 26

## 2024-05-26 RX ORDER — BENZOCAINE 10 %
1 GEL (GRAM) MUCOUS MEMBRANE EVERY 6 HOURS
Refills: 0 | Status: DISCONTINUED | OUTPATIENT
Start: 2024-05-26 | End: 2024-05-29

## 2024-05-26 RX ORDER — IBUPROFEN 200 MG
600 TABLET ORAL EVERY 6 HOURS
Refills: 0 | Status: DISCONTINUED | OUTPATIENT
Start: 2024-05-26 | End: 2024-05-29

## 2024-05-26 RX ORDER — SODIUM CHLORIDE 9 MG/ML
500 INJECTION INTRAMUSCULAR; INTRAVENOUS; SUBCUTANEOUS ONCE
Refills: 0 | Status: COMPLETED | OUTPATIENT
Start: 2024-05-26 | End: 2024-05-26

## 2024-05-26 RX ORDER — DIBUCAINE 1 %
1 OINTMENT (GRAM) RECTAL EVERY 6 HOURS
Refills: 0 | Status: DISCONTINUED | OUTPATIENT
Start: 2024-05-26 | End: 2024-05-29

## 2024-05-26 RX ORDER — PRAMOXINE HYDROCHLORIDE 150 MG/15G
1 AEROSOL, FOAM RECTAL EVERY 4 HOURS
Refills: 0 | Status: DISCONTINUED | OUTPATIENT
Start: 2024-05-26 | End: 2024-05-29

## 2024-05-26 RX ORDER — HYDROCORTISONE 1 %
1 OINTMENT (GRAM) TOPICAL EVERY 6 HOURS
Refills: 0 | Status: DISCONTINUED | OUTPATIENT
Start: 2024-05-26 | End: 2024-05-29

## 2024-05-26 RX ORDER — OXYCODONE HYDROCHLORIDE 5 MG/1
5 TABLET ORAL ONCE
Refills: 0 | Status: DISCONTINUED | OUTPATIENT
Start: 2024-05-26 | End: 2024-05-29

## 2024-05-26 RX ORDER — ACETAMINOPHEN 500 MG
975 TABLET ORAL
Refills: 0 | Status: DISCONTINUED | OUTPATIENT
Start: 2024-05-26 | End: 2024-05-29

## 2024-05-26 RX ORDER — AER TRAVELER 0.5 G/1
1 SOLUTION RECTAL; TOPICAL EVERY 4 HOURS
Refills: 0 | Status: DISCONTINUED | OUTPATIENT
Start: 2024-05-26 | End: 2024-05-29

## 2024-05-26 RX ORDER — OXYCODONE HYDROCHLORIDE 5 MG/1
5 TABLET ORAL
Refills: 0 | Status: DISCONTINUED | OUTPATIENT
Start: 2024-05-26 | End: 2024-05-29

## 2024-05-26 RX ORDER — MAGNESIUM SULFATE 500 MG/ML
1 VIAL (ML) INJECTION
Qty: 40 | Refills: 0 | Status: DISCONTINUED | OUTPATIENT
Start: 2024-05-26 | End: 2024-05-29

## 2024-05-26 RX ORDER — IBUPROFEN 200 MG
600 TABLET ORAL EVERY 6 HOURS
Refills: 0 | Status: COMPLETED | OUTPATIENT
Start: 2024-05-26 | End: 2025-04-24

## 2024-05-26 RX ORDER — KETOROLAC TROMETHAMINE 30 MG/ML
30 SYRINGE (ML) INJECTION ONCE
Refills: 0 | Status: DISCONTINUED | OUTPATIENT
Start: 2024-05-26 | End: 2024-05-29

## 2024-05-26 RX ORDER — PIPERACILLIN AND TAZOBACTAM 4; .5 G/20ML; G/20ML
3.38 INJECTION, POWDER, LYOPHILIZED, FOR SOLUTION INTRAVENOUS ONCE
Refills: 0 | Status: COMPLETED | OUTPATIENT
Start: 2024-05-26 | End: 2024-05-26

## 2024-05-26 RX ORDER — OXYTOCIN 10 UNIT/ML
41.67 VIAL (ML) INJECTION
Qty: 20 | Refills: 0 | Status: DISCONTINUED | OUTPATIENT
Start: 2024-05-26 | End: 2024-05-29

## 2024-05-26 RX ORDER — TETANUS TOXOID, REDUCED DIPHTHERIA TOXOID AND ACELLULAR PERTUSSIS VACCINE, ADSORBED 5; 2.5; 8; 8; 2.5 [IU]/.5ML; [IU]/.5ML; UG/.5ML; UG/.5ML; UG/.5ML
0.5 SUSPENSION INTRAMUSCULAR ONCE
Refills: 0 | Status: DISCONTINUED | OUTPATIENT
Start: 2024-05-26 | End: 2024-05-29

## 2024-05-26 RX ORDER — ACETAMINOPHEN 500 MG
1000 TABLET ORAL ONCE
Refills: 0 | Status: COMPLETED | OUTPATIENT
Start: 2024-05-26 | End: 2024-05-26

## 2024-05-26 RX ORDER — SIMETHICONE 80 MG/1
80 TABLET, CHEWABLE ORAL EVERY 4 HOURS
Refills: 0 | Status: DISCONTINUED | OUTPATIENT
Start: 2024-05-26 | End: 2024-05-29

## 2024-05-26 RX ORDER — SODIUM CHLORIDE 9 MG/ML
3 INJECTION INTRAMUSCULAR; INTRAVENOUS; SUBCUTANEOUS EVERY 8 HOURS
Refills: 0 | Status: DISCONTINUED | OUTPATIENT
Start: 2024-05-26 | End: 2024-05-29

## 2024-05-26 RX ORDER — DIPHENHYDRAMINE HCL 50 MG
25 CAPSULE ORAL EVERY 6 HOURS
Refills: 0 | Status: DISCONTINUED | OUTPATIENT
Start: 2024-05-26 | End: 2024-05-29

## 2024-05-26 RX ORDER — LANOLIN
1 OINTMENT (GRAM) TOPICAL EVERY 6 HOURS
Refills: 0 | Status: DISCONTINUED | OUTPATIENT
Start: 2024-05-26 | End: 2024-05-29

## 2024-05-26 RX ORDER — DIPHENOXYLATE HCL/ATROPINE 2.5-.025MG
2 TABLET ORAL ONCE
Refills: 0 | Status: DISCONTINUED | OUTPATIENT
Start: 2024-05-26 | End: 2024-05-26

## 2024-05-26 RX ORDER — CARBOPROST TROMETHAMINE 250 UG/ML
250 INJECTION, SOLUTION INTRAMUSCULAR ONCE
Refills: 0 | Status: COMPLETED | OUTPATIENT
Start: 2024-05-26 | End: 2024-05-26

## 2024-05-26 RX ORDER — MAGNESIUM HYDROXIDE 400 MG/1
30 TABLET, CHEWABLE ORAL
Refills: 0 | Status: DISCONTINUED | OUTPATIENT
Start: 2024-05-26 | End: 2024-05-29

## 2024-05-26 RX ORDER — PIPERACILLIN AND TAZOBACTAM 4; .5 G/20ML; G/20ML
3.38 INJECTION, POWDER, LYOPHILIZED, FOR SOLUTION INTRAVENOUS EVERY 8 HOURS
Refills: 0 | Status: DISCONTINUED | OUTPATIENT
Start: 2024-05-26 | End: 2024-05-28

## 2024-05-26 RX ADMIN — Medication 600 MILLIGRAM(S): at 18:24

## 2024-05-26 RX ADMIN — Medication 1000 MILLIUNIT(S)/MIN: at 09:33

## 2024-05-26 RX ADMIN — Medication 400 MILLIGRAM(S): at 09:40

## 2024-05-26 RX ADMIN — PIPERACILLIN AND TAZOBACTAM 200 GRAM(S): 4; .5 INJECTION, POWDER, LYOPHILIZED, FOR SOLUTION INTRAVENOUS at 10:59

## 2024-05-26 RX ADMIN — Medication 1000 MILLIGRAM(S): at 10:15

## 2024-05-26 RX ADMIN — Medication 2 TABLET(S): at 10:05

## 2024-05-26 RX ADMIN — Medication 975 MILLIGRAM(S): at 21:49

## 2024-05-26 RX ADMIN — Medication 975 MILLIGRAM(S): at 16:05

## 2024-05-26 RX ADMIN — SODIUM CHLORIDE 500 MILLILITER(S): 9 INJECTION INTRAMUSCULAR; INTRAVENOUS; SUBCUTANEOUS at 11:13

## 2024-05-26 RX ADMIN — Medication 975 MILLIGRAM(S): at 22:20

## 2024-05-26 RX ADMIN — Medication 975 MILLIGRAM(S): at 15:09

## 2024-05-26 RX ADMIN — CHLORHEXIDINE GLUCONATE 1 APPLICATION(S): 213 SOLUTION TOPICAL at 07:47

## 2024-05-26 RX ADMIN — PIPERACILLIN AND TAZOBACTAM 25 GRAM(S): 4; .5 INJECTION, POWDER, LYOPHILIZED, FOR SOLUTION INTRAVENOUS at 18:33

## 2024-05-26 RX ADMIN — CARBOPROST TROMETHAMINE 250 MICROGRAM(S): 250 INJECTION, SOLUTION INTRAMUSCULAR at 09:50

## 2024-05-26 NOTE — OB PROVIDER LABOR PROGRESS NOTE - NS_OBIHICONTRACTIONPATTERNDETAILS_OBGYN_ALL_OB_FT
no ctxs
contractions every 2-3 mins
toco: no ctxs noted
contractions every 4-6 minutes
q2-3min
on pitocin
contractions every 2-4 minutes
irregular

## 2024-05-26 NOTE — OB PROVIDER LABOR PROGRESS NOTE - NS_OBIHIFHRDETAILS_OBGYN_ALL_OB_FT
120, moderate variability, +accelerations, -decelerations
Baseline 150s, moderate variability, occasional early decels
120s mod gene + accels, no decels
120, moderate variability, +accelerations, -decelerations
baseline FHR 150s, moderate variability, +accels, -decels
efm: 120 +accel +mod variability no decels
130's, + accels, + LTV  [place] q 3-4 minutes    VE= 4/60/-3- AROM'd- copious clear fluid.
130s mod gene + accels, no decels
baseline FHR 150s, moderate varibaility, multiple prolonged decels for a total of 7 minutes followed by late decels.

## 2024-05-26 NOTE — OB PROVIDER LABOR PROGRESS NOTE - NS_SUBJECTIVE/OBJECTIVE_OBGYN_ALL_OB_FT
CB removed, 4/50/-3 ballotable  Cont to titrate pit, consider AROM when head descends  Cat 1 tracing  Patient repositioned to high fowlers  comfortable with epi in place
MD 1 On service note    H and P and labs reviewed.  EFW: 6.5 pounds  No headaches, no BV
Patient comfortable with epidural.
Patient reassessed for cervical change  SVE 0./50/-3, cont Buccal cytotec  Will attempt CB this afternoon  Tracing Cat 1
Pit @ 20, IUPC placed   Lates noted, repositioning provided resolution  Cont to monitor closely  Discussed with patient plan for continuation of IOL and consideration of CS at a later time prn  All questions answered
MD 1  Called to see patient for possible seizure vs vasovagal  Pt was nauseas and vomiting then "passed out" momentarily. BPs and pulse was normal.  ON exam BPs and pulse was normal.  Pt was alert and oriented.  Contemplating getting and epidural  on exam   patellar reflexes are normal bilaterally and no clonus noted.  Lungs clear bilat  Heart RRR
Patient re-exmained at [facility]. Reports feeling some contractions pain.
Patient noted ot have multiple prolonged decels for 7 minutes. Patient received top off epidural just prior to decels. Reports weakness in arms and ringing in the ears.
MD1 note  patient received at signout
Reexamined to assess for CB placement
Cook balloon 50/0 placed. Patient unable to tolerate further inflation
Pt w/o complaints.

## 2024-05-26 NOTE — OB NEONATOLOGY/PEDIATRICIAN DELIVERY SUMMARY - NSPEDSNEONOTESA_OBGYN_ALL_OB_FT
38 year-old  A positive, PNL negative, GBS negative  PMH: hysteroscopy, IVF, GDMA1, AMA,   Labor complicated by PEC and NRFHT  IOL for PEC. Mother received magnesium.  AROM. ROM X 17 hours    Baby cried spontaneously and then became apneic. No DCC. Baby brought to warmer.  Baby noted to be cyanotic and floppy. HR> 100. WDSS. Pre-ductal SpO2 probe applied. T-piece face mask CPAP 5/0.21 administered with rapid resumption of spontaneous breathing. Target SpO2 95% at 5 minutes. Grunting/flaring/retraction noted at 10 minutes. CPAP resumed at 6 cm/0.21.  Baby transported to NICU on CPAP+6/0.30 in heated carrier.   Baby temperature on admission to NICU = 37.0C.   Maternal post-partum fever 102.4F rectal. EOS recalculated 0.55 > 7.91

## 2024-05-26 NOTE — OB PROVIDER LABOR PROGRESS NOTE - NSVAGINALEXAM_OBGYN_ALL_OB_DT
25-May-2024 02:20
24-May-2024 02:08
24-May-2024 14:15
26-May-2024 05:19
25-May-2024 16:51
26-May-2024 07:57
26-May-2024 04:50
25-May-2024 03:53

## 2024-05-26 NOTE — CHART NOTE - NSCHARTNOTEFT_GEN_A_CORE
Patient with contractions every 5-6 minutes. FHT category I tracing. Currently on pitocin of 20. Plan to increase pitocin above 20. Discussed with Dr. Butler and agrees with plan.
Immediately after vaginal delivery, PECwSFoMg, pt meeting criteria for Code sepsis  TMax 102F rectal, HR 120s   Pt asymptomatic at this time   Code Sepsis   Started zosyn, stat labs ordered - CBC, CMP, lactate, UCx, ofirmev    On reevaluation, fever resolved, HR improved to low 100s. Asymptomatic, making good urine   Labs significant for leukocytosis 24, Hgb stable, Crt 0.8 > 1.50, mag level 5.5, on 1g magnesium   Lactate 4.1  Pt has already received 1100cc IV fluids. By 20cc/kg bolus for PEC pts, pt to receive total 1800cc IV fluids   Will give additional IV fluids, repeat lactate in 2 hours.    Discussed w/ Dr. Grajeda. Given pt is continuing to make adequate urine, will continue magnesium at this time and repeat cbc, cmp in 6 hours with next mag draw.   Continue to monitor closely, pt is stable for transfer to maternity.

## 2024-05-26 NOTE — OB PROVIDER DELIVERY SUMMARY - NSPROVIDERDELIVERYNOTE_OBGYN_ALL_OB_FT
at 0928 AM of a live male, 2780 gm and Apgars 7/8. Delivered RUI, no nuchal cord, clear fluid. Infant's head delivered with maternal expulsive efforts. Shoulders delivered without difficulty followed by the rest of the body. Nose and mouth were bulb suctioned. Cord clamped and cut after delay. Samples obtained. Baby handed to Pediatric team. Placenta delivered spontaneously, intact, 3VC. Lower uterine segment atony resolved with IV Pitocin, Hemabate IM and Cytotec 1000mcg per rectum. Bladder emptied by straight catherization. Perineum and vagina inspected – 2nd degree perineal laceration repaired under local anesthesia with 2-0 Vycril. EBL 325cc. Uterus became firm. Hemostasis noted. Pt tolerated procedure well, in stable condition, recovering in LDR. Infant in LDR. Instrument/sponge count correct x 2 and confirmed by nurse.  at 0928 AM of a live male, 2780 gm and Apgars 7/8. Peds present for delivery. Infant delivered RUI, no nuchal cord, clear fluid. Infant's head delivered with maternal expulsive efforts. Shoulders delivered without difficulty followed by the rest of the body. Nose and mouth were bulb suctioned. Cord clamped and cut after delay. Samples obtained. Baby handed to Pediatric team. Placenta delivered spontaneously, intact, 3VC, marginal cord insertion and accessory lobe noted, sent to pathology. Lower uterine segment atony resolved with IV Pitocin, Hemabate IM and Cytotec 1000mcg per rectum. Bladder emptied by straight catherization. Perineum and vagina inspected – 2nd degree perineal laceration repaired under local anesthesia with 2-0 Vycril. EBL 325cc. Uterus became firm. Hemostasis noted. Pt tolerated procedure well, in stable condition, recovering in LDR. Infant in NICU. Instrument/sponge count correct x 2 and confirmed by nurse.  Addendum:   Fetal/maternal tachycardia noted right before delivery, after infant was born, maternal rectal temp was done showing temperature 102.4, sepsis code was called, patient started on Zosyn, blood/urine cultures sent.

## 2024-05-26 NOTE — OB PROVIDER LABOR PROGRESS NOTE - ASSESSMENT
A/P: 36.6 weeks  induction for sPEC on magnesium  No change after cytotec regimen. Will start on pitocin  Will try to insert CB when possible  Cont to monitor    PLEE  
Continue pitocin  Continue epidural  Patient placed on right with peanut ball  Continuous monitoring
IOL 37+0, PECwSF on Mag 1 g/hr s/p IVP labetalol x 2 currently on procardia 30 XL  s/p buccal cytotec and vaginal cytotec on pitocin 28 mU/mL  currently pushing with good progress to +1  prepare for delivery   peds to be present for delivery  high PPH risk, precautions to be taken  2 units on hold
-VSS  -Patient requesting epidural top off.
A/P: 36 week induction for sPEC on magnesium  Will obtain PIH labs with her next mag level draw  Clinical scenario most consistent with vasovagal episode due to cervical stimulation  Will cont to observe
-BP noted to be as low at 80s/50s/  -Patient positioned in hands and knees with improvement of FHT.  -Pitocin stopped.   -IVF bolus started.  -Anesthesia at bedside to administer ephedrine.
Plan  -Category 1 tracing  -Continuous FHR and toco monitoring  -SVE prn  -C/w buccal cytotec. Discussed cervical exam, that external os is also closed, and that balloon placement would be difficult at this time. Will continue to defer placement until at least 0.5cm.  
Plan  -Category 1 tracing  -Continuous FHR and toco monitoring  -SVE prn  -C/w Pitocin  -Zofran for nausea  
Category I tracing   s/p cytotec x 7 doses  will placed cytotec vaginally at 2:30pm  cont to monitor closely

## 2024-05-26 NOTE — OB PROVIDER DELIVERY SUMMARY - NS_DELIVERYATTENDING1_OBGYN_ALL_OB_FT
Latrice Grajeda MD No Repair - Repaired With Adjacent Surgical Defect Text (Leave Blank If You Do Not Want): After the excision the defect was repaired concurrently with another surgical defect which was in close approximation.

## 2024-05-27 LAB
ALBUMIN SERPL ELPH-MCNC: 1.7 G/DL — LOW (ref 3.3–5)
ALP SERPL-CCNC: 116 U/L — SIGNIFICANT CHANGE UP (ref 40–120)
ALT FLD-CCNC: 18 U/L — SIGNIFICANT CHANGE UP (ref 12–78)
ANION GAP SERPL CALC-SCNC: 8 MMOL/L — SIGNIFICANT CHANGE UP (ref 5–17)
AST SERPL-CCNC: 38 U/L — HIGH (ref 15–37)
BILIRUB SERPL-MCNC: 0.4 MG/DL — SIGNIFICANT CHANGE UP (ref 0.2–1.2)
BUN SERPL-MCNC: 13 MG/DL — SIGNIFICANT CHANGE UP (ref 7–23)
CALCIUM SERPL-MCNC: 7.9 MG/DL — LOW (ref 8.5–10.1)
CHLORIDE SERPL-SCNC: 107 MMOL/L — SIGNIFICANT CHANGE UP (ref 96–108)
CO2 SERPL-SCNC: 22 MMOL/L — SIGNIFICANT CHANGE UP (ref 22–31)
CREAT SERPL-MCNC: 0.97 MG/DL — SIGNIFICANT CHANGE UP (ref 0.5–1.3)
EGFR: 76 ML/MIN/1.73M2 — SIGNIFICANT CHANGE UP
GLUCOSE SERPL-MCNC: 84 MG/DL — SIGNIFICANT CHANGE UP (ref 70–99)
HCT VFR BLD CALC: 26.1 % — LOW (ref 34.5–45)
HGB BLD-MCNC: 8.8 G/DL — LOW (ref 11.5–15.5)
MAGNESIUM SERPL-MCNC: 4.7 MG/DL — HIGH (ref 1.6–2.6)
MAGNESIUM SERPL-MCNC: 4.9 MG/DL — HIGH (ref 1.6–2.6)
MCHC RBC-ENTMCNC: 30.8 PG — SIGNIFICANT CHANGE UP (ref 27–34)
MCHC RBC-ENTMCNC: 33.7 GM/DL — SIGNIFICANT CHANGE UP (ref 32–36)
MCV RBC AUTO: 91.3 FL — SIGNIFICANT CHANGE UP (ref 80–100)
PLATELET # BLD AUTO: 174 K/UL — SIGNIFICANT CHANGE UP (ref 150–400)
POTASSIUM SERPL-MCNC: 3.6 MMOL/L — SIGNIFICANT CHANGE UP (ref 3.5–5.3)
POTASSIUM SERPL-SCNC: 3.6 MMOL/L — SIGNIFICANT CHANGE UP (ref 3.5–5.3)
PROT SERPL-MCNC: 4.7 GM/DL — LOW (ref 6–8.3)
RBC # BLD: 2.86 M/UL — LOW (ref 3.8–5.2)
RBC # FLD: 13.8 % — SIGNIFICANT CHANGE UP (ref 10.3–14.5)
SODIUM SERPL-SCNC: 137 MMOL/L — SIGNIFICANT CHANGE UP (ref 135–145)
WBC # BLD: 17.7 K/UL — HIGH (ref 3.8–10.5)
WBC # FLD AUTO: 17.7 K/UL — HIGH (ref 3.8–10.5)

## 2024-05-27 RX ORDER — IRON SUCROSE 20 MG/ML
200 INJECTION, SOLUTION INTRAVENOUS ONCE
Refills: 0 | Status: DISCONTINUED | OUTPATIENT
Start: 2024-05-27 | End: 2024-05-27

## 2024-05-27 RX ADMIN — Medication 975 MILLIGRAM(S): at 15:18

## 2024-05-27 RX ADMIN — SIMETHICONE 80 MILLIGRAM(S): 80 TABLET, CHEWABLE ORAL at 01:20

## 2024-05-27 RX ADMIN — Medication 975 MILLIGRAM(S): at 03:22

## 2024-05-27 RX ADMIN — Medication 600 MILLIGRAM(S): at 06:45

## 2024-05-27 RX ADMIN — Medication 30 MILLIGRAM(S): at 16:23

## 2024-05-27 RX ADMIN — Medication 600 MILLIGRAM(S): at 00:17

## 2024-05-27 RX ADMIN — Medication 975 MILLIGRAM(S): at 09:12

## 2024-05-27 RX ADMIN — PIPERACILLIN AND TAZOBACTAM 25 GRAM(S): 4; .5 INJECTION, POWDER, LYOPHILIZED, FOR SOLUTION INTRAVENOUS at 11:40

## 2024-05-27 RX ADMIN — Medication 975 MILLIGRAM(S): at 21:33

## 2024-05-27 RX ADMIN — Medication 600 MILLIGRAM(S): at 17:28

## 2024-05-27 RX ADMIN — PIPERACILLIN AND TAZOBACTAM 25 GRAM(S): 4; .5 INJECTION, POWDER, LYOPHILIZED, FOR SOLUTION INTRAVENOUS at 18:46

## 2024-05-27 RX ADMIN — Medication 600 MILLIGRAM(S): at 11:41

## 2024-05-27 RX ADMIN — Medication 975 MILLIGRAM(S): at 03:50

## 2024-05-27 RX ADMIN — Medication 600 MILLIGRAM(S): at 12:30

## 2024-05-27 RX ADMIN — Medication 975 MILLIGRAM(S): at 16:00

## 2024-05-27 RX ADMIN — Medication 1 TABLET(S): at 11:41

## 2024-05-27 RX ADMIN — Medication 600 MILLIGRAM(S): at 00:50

## 2024-05-27 RX ADMIN — PIPERACILLIN AND TAZOBACTAM 25 GRAM(S): 4; .5 INJECTION, POWDER, LYOPHILIZED, FOR SOLUTION INTRAVENOUS at 03:22

## 2024-05-27 RX ADMIN — Medication 975 MILLIGRAM(S): at 22:00

## 2024-05-27 RX ADMIN — Medication 600 MILLIGRAM(S): at 18:24

## 2024-05-27 RX ADMIN — Medication 600 MILLIGRAM(S): at 06:15

## 2024-05-27 RX ADMIN — Medication 975 MILLIGRAM(S): at 10:24

## 2024-05-27 NOTE — PROGRESS NOTE ADULT - ATTENDING COMMENTS
patient voiding and ambulating with no issues, reports light lochia, denies any PEC symptoms, lactate and Cr back to normal today, continues to be afebrile, vitals stable, Mag to be discontinued this morning, BP wnl, will titrate procardia as needed.

## 2024-05-27 NOTE — PROGRESS NOTE ADULT - SUBJECTIVE AND OBJECTIVE BOX
ROBBIN PRADO is a 37yo  now PPD#1 s/p spontaneous vaginal delivery at 37 weeks gestation. Pregnancy c/b GDMA1. gHTN with progression to PECwSF s/p 20IVP labetalol, currently on Magnesium at 1g/hr. Delivery c/b code sepsis, on zosyn for 24 hours.    S:    The patient has no complaints.  Pain controlled with current medications.   She is ambulating without difficulty and tolerating PO   + flatus/-BM/+ voiding     O:    T(C): 36.9 (24 @ 06:10), Max: 36.9 (24 @ 22:10)  HR: 60 (24 @ 06:10) (57 - 101)  BP: 119/77 (24 @ 06:10) (107/61 - 149/79)  RR: 16 (24 @ 06:10) (16 - 17)  SpO2: 99% (24 @ 06:10) (96% - 100%)    Gen: NAD, AOx3  CV: RRR  Pulm: CTAB  Breast: nontender, non-engorged   Abdomen:  soft, non-tender, non-distended, +bowel sounds.  Uterus:  Fundus firm below umbilicus  VE:  +lochia  Ext:  Non-tender.                          11.0   29.17 )-----------( 220      ( 26 May 2024 17:01 )             32.6         137  |  107  |  11  ----------------------------<  120<H>  4.2   |  21<L>  |  1.45<H>    Ca    8.3<L>      26 May 2024 17:01  Mg     4.9         TPro  5.3<L>  /  Alb  1.8<L>  /  TBili  0.6  /  DBili  x   /  AST  40<H>  /  ALT  20  /  AlkPhos  154<H>        A/P:  37yo  now PPD#1 s/p spontaneous vaginal delivery at 37 weeks gestation. Pregnancy c/b GDMA1. gHTN with progression to PECwSF s/p 20IVP labetalol, currently on Magnesium at 1g/hr. Delivery c/b code sepsis, on zosyn for 24 hours.  -Vital signs stable (BP's 100's-140's/50-70's)  -Postpartum H&H stable (12.1 >11.0)  -Voiding, tolerating PO, bowel function nml   -Advance care as tolerated   -Continue routine postpartum care and education.  -Healthy male infant, desires/declines circumcision?***  - Cont magnesium and zosyn x 24 hours post delivery   ROBBIN PRADO is a 39yo  now PPD#1 s/p spontaneous vaginal delivery at 37 weeks gestation. Pregnancy c/b GDMA1. gHTN with progression to PECwSF s/p 20IVP labetalol, currently on Magnesium at 1g/hr. Delivery c/b code sepsis, on zosyn for 24 hours.    S:    The patient has no complaints.  Pain controlled with current medications.   She is ambulating without difficulty and tolerating PO   + flatus/-BM/+ voiding     O:    T(C): 36.9 (24 @ 06:10), Max: 36.9 (24 @ 22:10)  HR: 60 (24 @ 06:10) (57 - 101)  BP: 119/77 (24 @ 06:10) (107/61 - 149/79)  RR: 16 (24 @ 06:10) (16 - 17)  SpO2: 99% (24 @ 06:10) (96% - 100%)    Gen: NAD, AOx3  CV: RRR  Pulm: CTAB  Breast: nontender, non-engorged   Abdomen:  soft, non-tender, non-distended, +bowel sounds.  Uterus:  Fundus firm below umbilicus  VE:  +lochia  Ext:  Non-tender.                          11.0   29.17 )-----------( 220      ( 26 May 2024 17:01 )             32.6         137  |  107  |  11  ----------------------------<  120<H>  4.2   |  21<L>  |  1.45<H>    Ca    8.3<L>      26 May 2024 17:01  Mg     4.9         TPro  5.3<L>  /  Alb  1.8<L>  /  TBili  0.6  /  DBili  x   /  AST  40<H>  /  ALT  20  /  AlkPhos  154<H>        A/P:  39yo  now PPD#1 s/p spontaneous vaginal delivery at 37 weeks gestation. Pregnancy c/b GDMA1. gHTN with progression to PECwSF s/p 20IVP labetalol, currently on Magnesium at 1g/hr. Delivery c/b code sepsis, on zosyn for 24 hours.  -Vital signs stable (BP's 100's-140's/50-70's)  -Postpartum H&H stable (12.1 >11.0)  -Elevated Cr, adequate urine output, will repeat CMP today, Mag levels holding  -Voiding, tolerating PO, bowel function nml   -Advance care as tolerated   -Continue routine postpartum care and education.  -Healthy male infant, desires circumcision  - Cont magnesium and zosyn x 24 hours post delivery

## 2024-05-28 ENCOUNTER — TRANSCRIPTION ENCOUNTER (OUTPATIENT)
Age: 39
End: 2024-05-28

## 2024-05-28 ENCOUNTER — APPOINTMENT (OUTPATIENT)
Dept: ANTEPARTUM | Facility: CLINIC | Age: 39
End: 2024-05-28

## 2024-05-28 RX ORDER — IBUPROFEN 200 MG
1 TABLET ORAL
Qty: 0 | Refills: 0 | DISCHARGE
Start: 2024-05-28

## 2024-05-28 RX ORDER — ACETAMINOPHEN 500 MG
3 TABLET ORAL
Qty: 0 | Refills: 0 | DISCHARGE
Start: 2024-05-28

## 2024-05-28 RX ORDER — NIFEDIPINE 30 MG
1 TABLET, EXTENDED RELEASE 24 HR ORAL
Qty: 30 | Refills: 0
Start: 2024-05-28 | End: 2024-06-26

## 2024-05-28 RX ADMIN — SIMETHICONE 80 MILLIGRAM(S): 80 TABLET, CHEWABLE ORAL at 03:31

## 2024-05-28 RX ADMIN — Medication 600 MILLIGRAM(S): at 06:19

## 2024-05-28 RX ADMIN — Medication 600 MILLIGRAM(S): at 06:50

## 2024-05-28 RX ADMIN — Medication 30 MILLIGRAM(S): at 23:08

## 2024-05-28 RX ADMIN — PIPERACILLIN AND TAZOBACTAM 25 GRAM(S): 4; .5 INJECTION, POWDER, LYOPHILIZED, FOR SOLUTION INTRAVENOUS at 03:32

## 2024-05-28 RX ADMIN — Medication 975 MILLIGRAM(S): at 09:30

## 2024-05-28 RX ADMIN — Medication 600 MILLIGRAM(S): at 18:15

## 2024-05-28 RX ADMIN — Medication 600 MILLIGRAM(S): at 17:41

## 2024-05-28 RX ADMIN — Medication 600 MILLIGRAM(S): at 12:00

## 2024-05-28 RX ADMIN — Medication 1 TABLET(S): at 11:23

## 2024-05-28 RX ADMIN — Medication 975 MILLIGRAM(S): at 03:28

## 2024-05-28 RX ADMIN — Medication 975 MILLIGRAM(S): at 14:53

## 2024-05-28 RX ADMIN — Medication 600 MILLIGRAM(S): at 11:24

## 2024-05-28 RX ADMIN — Medication 600 MILLIGRAM(S): at 00:35

## 2024-05-28 RX ADMIN — Medication 600 MILLIGRAM(S): at 01:05

## 2024-05-28 RX ADMIN — Medication 975 MILLIGRAM(S): at 08:45

## 2024-05-28 RX ADMIN — Medication 975 MILLIGRAM(S): at 15:30

## 2024-05-28 RX ADMIN — Medication 975 MILLIGRAM(S): at 03:55

## 2024-05-28 NOTE — DISCHARGE NOTE OB - CARE PROVIDER_API CALL
Cally Small  Obstetrics and Gynecology  [address] [place] [address]  Phone: [phone]  Fax: [phone]  Follow Up Time:

## 2024-05-28 NOTE — DISCHARGE NOTE OB - MEDICATION SUMMARY - MEDICATIONS TO STOP TAKING
I will STOP taking the medications listed below when I get home from the hospital:    oxyCODONE 5 mg oral tablet  -- 1 tab(s) by mouth prn, As Needed MDD:3  -- Caution federal law prohibits the transfer of this drug to any person other  than the person for whom it was prescribed.  It is very important that you take or use this exactly as directed.  Do not skip doses or discontinue unless directed by your doctor.  May cause drowsiness or dizziness.  This prescription cannot be refilled.  Using more of this medication than prescribed may cause serious breathing problems.

## 2024-05-28 NOTE — DISCHARGE NOTE OB - AVOID DOUCHING OR TAMPONS UNTIL YOUR POSTPARTUM VISIT
Procedure(s): 
COLONOSCOPY with polypectomy ENDOSCOPY with dilation. MAC Anesthesia Post Evaluation Multimodal analgesia: multimodal analgesia used between 6 hours prior to anesthesia start to PACU discharge Patient location during evaluation: bedside Patient participation: complete - patient participated Level of consciousness: awake Pain score: 0 Pain management: adequate Airway patency: patent Anesthetic complications: no 
Cardiovascular status: stable Respiratory status: acceptable Hydration status: acceptable Post anesthesia nausea and vomiting:  controlled Vitals Value Taken Time /71 3/29/2019  1:50 PM  
Temp 36.9 °C (98.5 °F) 3/29/2019  1:32 PM  
Pulse 70 3/29/2019  1:53 PM  
Resp 15 3/29/2019  1:53 PM  
SpO2 96 % 3/29/2019  1:53 PM  
Vitals shown include unvalidated device data. Statement Selected

## 2024-05-28 NOTE — DISCHARGE NOTE OB - CARE PLAN
1 Principal Discharge DX:	Spontaneous vaginal delivery  Assessment and plan of treatment:	Please call your provider in 1 week. Take medications as directed, regular diet, activity as tolerated. Exclusive breast feeding for the first 6 months is recommended. Nothing per vagina for 6 weeks (incl. sex, douching, etc). If you have additional concerns, please inform your provider.  Secondary Diagnosis:	Severe preeclampsia  Assessment and plan of treatment:	Take your blood pressure at home daily, take your medications as prescribed  Call your doctor if the top number (systolic) is 140 or higher, or if your bottom number(diastolic) is 90 or higher.   Keep a log of your blood pressures and bring with you to your postpartum visit.   Schedule an appointment with Cardio OB Provider  Secondary Diagnosis:	Chorioamnionitis  Secondary Diagnosis:	Sepsis, unspecified organism  Secondary Diagnosis:	Anemia due to acute blood loss  Assessment and plan of treatment:	Take iron once daily at home. Please call your doctor or come to the ED if you experience heavy bleeding, lightheadedness/dizziness, chest pain, difficulty breathing, syncope.

## 2024-05-28 NOTE — DISCHARGE NOTE OB - PATIENT PORTAL LINK FT
You can access the FollowMyHealth Patient Portal offered by [facility] by registering at the following website: [email or web address]. By joining [facility]’s FollowMyHealth portal, you will also be able to view your health information using other applications (apps) compatible with our system.

## 2024-05-28 NOTE — PROGRESS NOTE ADULT - SUBJECTIVE AND OBJECTIVE BOX
S: Patient doing well. Minimal lochia. No complaints.    O: Vital Signs Last 24 Hrs  T(C): 36.4 (28 May 2024 06:29), Max: 36.7 (27 May 2024 12:10)  T(F): 97.6 (28 May 2024 06:29), Max: 98.1 (27 May 2024 12:10)  HR: 59 (28 May 2024 06:29) (58 - 72)  BP: 116/76 (28 May 2024 06:29) (116/76 - 148/83)  BP(mean): --  RR: 16 (28 May 2024 06:29) (16 - 18)  SpO2: 98% (28 May 2024 06:29) (98% - 100%)    Parameters below as of 28 May 2024 06:29  Patient On (Oxygen Delivery Method): room air        Gen: NAD  Breasts:  Abd: soft, NT, ND,   Fundus:  Ext: no tenderness    Labs:                        8.8    17.70 )-----------( 174      ( 27 May 2024 08:16 )             26.1        Rubella status:    A: 39y PPD#2 s/p      Doing well    Plan:  [ ] Routine post partum care  discharge home in the am

## 2024-05-28 NOTE — DISCHARGE NOTE OB - HOSPITAL COURSE
Patient underwent a normal spontaneous vaginal delivery. Course was complicated by preeclampsia with severe features, received magnesium sulfate therapy and started on a standing anti-hypertensive. Course also complicated by code sepsis secondary to chorioamnionitis, received antibiotics and IV fluid resuscitation with appropriate response. Pt with acute blood loss anemia, received venofer. On day of discharge, Pain is well controlled with PRN medication. She has no difficulty with ambulation, voiding, or PO intake. Lab values and vital signs are within normal limits prior to discharge.

## 2024-05-28 NOTE — DISCHARGE NOTE OB - PLAN OF CARE
Take iron once daily at home. Please call your doctor or come to the ED if you experience heavy bleeding, lightheadedness/dizziness, chest pain, difficulty breathing, syncope. Take your blood pressure at home daily, take your medications as prescribed  Call your doctor if the top number (systolic) is 140 or higher, or if your bottom number(diastolic) is 90 or higher.   Keep a log of your blood pressures and bring with you to your postpartum visit.   Schedule an appointment with Cardio OB Provider Please call your provider in 1 week. Take medications as directed, regular diet, activity as tolerated. Exclusive breast feeding for the first 6 months is recommended. Nothing per vagina for 6 weeks (incl. sex, douching, etc). If you have additional concerns, please inform your provider.

## 2024-05-29 VITALS — SYSTOLIC BLOOD PRESSURE: 135 MMHG | DIASTOLIC BLOOD PRESSURE: 78 MMHG

## 2024-05-29 RX ORDER — NIFEDIPINE 30 MG
30 TABLET, EXTENDED RELEASE 24 HR ORAL DAILY
Refills: 0 | Status: DISCONTINUED | OUTPATIENT
Start: 2024-05-29 | End: 2024-05-29

## 2024-05-29 RX ADMIN — Medication 30 MILLIGRAM(S): at 10:54

## 2024-05-29 NOTE — PROGRESS NOTE ADULT - SUBJECTIVE AND OBJECTIVE BOX
S: Patient doing well. Minimal lochia. No complaints.  day 3  Pumping as son in nursery.     O: Vital Signs Last 24 Hrs  T(C): 36.6 (28 May 2024 23:00), Max: 36.9 (28 May 2024 16:05)  T(F): 97.9 (28 May 2024 23:00), Max: 98.4 (28 May 2024 16:05)  HR: 71 (28 May 2024 23:00) (59 - 71)  BP: 132/77 (28 May 2024 23:00) (129/73 - 135/79)  BP(mean): --  RR: 18 (28 May 2024 23:00) (18 - 18)  SpO2: 100% (28 May 2024 23:00) (99% - 100%)    Parameters below as of 28 May 2024 23:00  Patient On (Oxygen Delivery Method): room air        Gen: NAD  Abd: soft, NT, ND, fundus firm below umbilicus  Ext: no tenderness, moderate edema    Labs:          Rubella status: IMMUNE    A: 39y PPD# 3 s/p      Doing well but BP elevated today.    Plan:  Severe pre-eclampsia  Will resume procardia 30 mg this am. Dosing at night needed to be held.  Will observe following dose and decide upon discharge.    [ ] Discharge home instructions provided.  Return to Dr Small in 1 week for BP check.  Nothing in vagina and no heavy lifting for 6 weeks.   Follow up 6 weeks for post partum visit.  Call office for any fevers, chills, heavy vaginal bleeding, symptoms of depression, or any other concerning symptoms.  Continue motrin 600 mg every 6 hours.

## 2024-05-30 ENCOUNTER — NON-APPOINTMENT (OUTPATIENT)
Age: 39
End: 2024-05-30

## 2024-05-30 ENCOUNTER — APPOINTMENT (OUTPATIENT)
Dept: CARDIOLOGY | Facility: CLINIC | Age: 39
End: 2024-05-30
Payer: COMMERCIAL

## 2024-05-30 ENCOUNTER — APPOINTMENT (OUTPATIENT)
Dept: OBGYN | Facility: CLINIC | Age: 39
End: 2024-05-30

## 2024-05-30 VITALS
BODY MASS INDEX: 30.99 KG/M2 | WEIGHT: 186 LBS | OXYGEN SATURATION: 96 % | HEART RATE: 97 BPM | DIASTOLIC BLOOD PRESSURE: 80 MMHG | SYSTOLIC BLOOD PRESSURE: 130 MMHG | HEIGHT: 65 IN

## 2024-05-30 VITALS — SYSTOLIC BLOOD PRESSURE: 130 MMHG | DIASTOLIC BLOOD PRESSURE: 88 MMHG

## 2024-05-30 PROCEDURE — 99204 OFFICE O/P NEW MOD 45 MIN: CPT | Mod: 25

## 2024-05-30 PROCEDURE — 93000 ELECTROCARDIOGRAM COMPLETE: CPT

## 2024-05-30 PROCEDURE — G2211 COMPLEX E/M VISIT ADD ON: CPT | Mod: NC

## 2024-05-30 RX ORDER — NIFEDIPINE 30 MG/1
30 TABLET, EXTENDED RELEASE ORAL DAILY
Qty: 60 | Refills: 1 | Status: ACTIVE | COMMUNITY
Start: 2024-05-30 | End: 1900-01-01

## 2024-05-30 RX ORDER — LABETALOL HYDROCHLORIDE 200 MG/1
200 TABLET, FILM COATED ORAL TWICE DAILY
Qty: 60 | Refills: 0 | Status: DISCONTINUED | COMMUNITY
Start: 2024-05-23 | End: 2024-05-30

## 2024-05-30 NOTE — DISCUSSION/SUMMARY
[FreeTextEntry1] : 39F  gestational diabetes (diet controlled) recent postpartum 24 vaginal delivery at 37 weeks with preeclampsia discharged home yesterday on nifedipine 30mg but did not start yet, refer for cardiology evaluation.   Needs to start nifedipine 30mg daily if home BP >140/90s, but anticipate resolution of preeclampsia within the next 4-8 weeks, can be at risk for early onset HTN and long term cardiovascular risk, advised lifestyle modification of low salt diet and dieting/exercise with also PCP visits for risk factors screening.  -EKG within normal and no cardiac complains, no further cardiac testing indicated.  -follow up with GYN.    Follow up in 2 months if unable to wean off nifedipine use.  [EKG obtained to assist in diagnosis and management of assessed problem(s)] : EKG obtained to assist in diagnosis and management of assessed problem(s)

## 2024-05-30 NOTE — HISTORY OF PRESENT ILLNESS
[FreeTextEntry1] : 39F  gestational diabetes (diet controlled) recent postpartum 24 vaginal delivery at 37 weeks with preeclampsia discharged home yesterday on nifedipine 30mg but did not start yet, refer for cardiology evaluation.   Patient presents with her mother for the visit.  Reports no issue with her BP prior to her pregnancy, this morning home /97, has not received the nifedipine yet as was sent to the incorrect pharmacy on discharge, she was on low dose ASA during the pregnancy. Denies cardiac complains. Her Babyboy is still in the NICU but progressing.    Mother with HTN Grandmother with CAD/MI  Works as teacher

## 2024-05-31 ENCOUNTER — NON-APPOINTMENT (OUTPATIENT)
Age: 39
End: 2024-05-31

## 2024-05-31 LAB
CULTURE RESULTS: SIGNIFICANT CHANGE UP
CULTURE RESULTS: SIGNIFICANT CHANGE UP
SPECIMEN SOURCE: SIGNIFICANT CHANGE UP
SPECIMEN SOURCE: SIGNIFICANT CHANGE UP

## 2024-06-02 DIAGNOSIS — O41.1230 CHORIOAMNIONITIS, THIRD TRIMESTER, NOT APPLICABLE OR UNSPECIFIED: ICD-10-CM

## 2024-06-02 DIAGNOSIS — E66.8 OTHER OBESITY: ICD-10-CM

## 2024-06-02 DIAGNOSIS — D62 ACUTE POSTHEMORRHAGIC ANEMIA: ICD-10-CM

## 2024-06-02 DIAGNOSIS — A41.9 SEPSIS, UNSPECIFIED ORGANISM: ICD-10-CM

## 2024-06-02 DIAGNOSIS — Z3A.36 36 WEEKS GESTATION OF PREGNANCY: ICD-10-CM

## 2024-06-03 ENCOUNTER — APPOINTMENT (OUTPATIENT)
Dept: MATERNAL FETAL MEDICINE | Facility: CLINIC | Age: 39
End: 2024-06-03

## 2024-06-03 ENCOUNTER — ASOB RESULT (OUTPATIENT)
Age: 39
End: 2024-06-03

## 2024-06-03 ENCOUNTER — APPOINTMENT (OUTPATIENT)
Dept: ANTEPARTUM | Facility: CLINIC | Age: 39
End: 2024-06-03

## 2024-06-03 ENCOUNTER — APPOINTMENT (OUTPATIENT)
Age: 39
End: 2024-06-03
Payer: COMMERCIAL

## 2024-06-03 DIAGNOSIS — Z86.32 PERSONAL HISTORY OF GESTATIONAL DIABETES: ICD-10-CM

## 2024-06-03 PROCEDURE — S9443: CPT | Mod: 95

## 2024-06-03 PROCEDURE — G0108 DIAB MANAGE TRN  PER INDIV: CPT | Mod: 95

## 2024-06-03 PROCEDURE — 98960 EDU&TRN PT SELF-MGMT NQHP 1: CPT | Mod: 95

## 2024-06-05 ENCOUNTER — APPOINTMENT (OUTPATIENT)
Dept: OBGYN | Facility: CLINIC | Age: 39
End: 2024-06-05

## 2024-06-07 LAB — SURGICAL PATHOLOGY STUDY: SIGNIFICANT CHANGE UP

## 2024-06-10 ENCOUNTER — APPOINTMENT (OUTPATIENT)
Dept: ANTEPARTUM | Facility: CLINIC | Age: 39
End: 2024-06-10

## 2024-06-20 NOTE — OB RN PATIENT PROFILE - NS_LMP_OBGYN_ALL_OB_DT
Problem: Discharge Planning  Goal: Discharge to home or other facility with appropriate resources  6/20/2024 0502 by Valerie Jauregui RN  Outcome: Progressing  6/19/2024 1843 by Juarez Arguelles RN  Outcome: Progressing  Flowsheets (Taken 6/19/2024 1843)  Discharge to home or other facility with appropriate resources: Identify barriers to discharge with patient and caregiver     Problem: Safety - Adult  Goal: Free from fall injury  6/20/2024 0502 by Valerie Jauregui RN  Outcome: Progressing  6/19/2024 1843 by Juarez Arguelles RN  Outcome: Progressing  Flowsheets (Taken 6/19/2024 1843)  Free From Fall Injury: Instruct family/caregiver on patient safety     Problem: ABCDS Injury Assessment  Goal: Absence of physical injury  6/20/2024 0502 by Valerie Jauregui RN  Outcome: Progressing  6/19/2024 1843 by Juarez Arguelles RN  Outcome: Progressing  Flowsheets (Taken 6/19/2024 1843)  Absence of Physical Injury: Implement safety measures based on patient assessment      28-Sep-2023

## 2024-07-09 ENCOUNTER — APPOINTMENT (OUTPATIENT)
Dept: OBGYN | Facility: CLINIC | Age: 39
End: 2024-07-09
Payer: COMMERCIAL

## 2024-07-09 VITALS
WEIGHT: 172 LBS | BODY MASS INDEX: 28.66 KG/M2 | SYSTOLIC BLOOD PRESSURE: 126 MMHG | HEIGHT: 65 IN | DIASTOLIC BLOOD PRESSURE: 86 MMHG

## 2024-07-09 PROCEDURE — 0503F POSTPARTUM CARE VISIT: CPT

## 2024-07-09 RX ORDER — NORETHINDRONE 0.35 MG/1
0.35 TABLET ORAL
Qty: 3 | Refills: 3 | Status: ACTIVE | COMMUNITY
Start: 2024-07-09 | End: 1900-01-01

## 2024-10-30 ENCOUNTER — APPOINTMENT (OUTPATIENT)
Dept: OBGYN | Facility: CLINIC | Age: 39
End: 2024-10-30
Payer: COMMERCIAL

## 2024-10-30 VITALS
HEIGHT: 65 IN | DIASTOLIC BLOOD PRESSURE: 70 MMHG | BODY MASS INDEX: 28.82 KG/M2 | WEIGHT: 173 LBS | SYSTOLIC BLOOD PRESSURE: 120 MMHG

## 2024-10-30 DIAGNOSIS — Z12.39 ENCOUNTER FOR OTHER SCREENING FOR MALIGNANT NEOPLASM OF BREAST: ICD-10-CM

## 2024-10-30 PROCEDURE — 99396 PREV VISIT EST AGE 40-64: CPT

## 2024-12-03 DIAGNOSIS — Z34.93 ENCOUNTER FOR SUPERVISION OF NORMAL PREGNANCY, UNSPECIFIED, THIRD TRIMESTER: ICD-10-CM

## 2024-12-03 DIAGNOSIS — Z34.92 ENCOUNTER FOR SUPERVISION OF NORMAL PREGNANCY, UNSPECIFIED, SECOND TRIMESTER: ICD-10-CM

## 2024-12-03 DIAGNOSIS — Z34.91 ENCOUNTER FOR SUPERVISION OF NORMAL PREGNANCY, UNSPECIFIED, FIRST TRIMESTER: ICD-10-CM

## 2024-12-03 DIAGNOSIS — Z3A.33 33 WEEKS GESTATION OF PREGNANCY: ICD-10-CM

## 2025-07-30 DIAGNOSIS — Z12.39 ENCOUNTER FOR OTHER SCREENING FOR MALIGNANT NEOPLASM OF BREAST: ICD-10-CM

## 2025-08-06 ENCOUNTER — APPOINTMENT (OUTPATIENT)
Dept: OBGYN | Facility: CLINIC | Age: 40
End: 2025-08-06
Payer: COMMERCIAL

## 2025-08-06 VITALS
HEIGHT: 65 IN | BODY MASS INDEX: 29.99 KG/M2 | DIASTOLIC BLOOD PRESSURE: 70 MMHG | WEIGHT: 180 LBS | SYSTOLIC BLOOD PRESSURE: 110 MMHG

## 2025-08-06 DIAGNOSIS — Z01.419 ENCOUNTER FOR GYNECOLOGICAL EXAMINATION (GENERAL) (ROUTINE) W/OUT ABNORMAL FINDINGS: ICD-10-CM

## 2025-08-06 PROCEDURE — 99215 OFFICE O/P EST HI 40 MIN: CPT | Mod: 25

## 2025-08-06 PROCEDURE — 76831 ECHO EXAM UTERUS: CPT

## 2025-08-06 PROCEDURE — 99396 PREV VISIT EST AGE 40-64: CPT

## 2025-08-07 LAB
ESTIMATED AVERAGE GLUCOSE: 111 MG/DL
HBA1C MFR BLD HPLC: 5.5 %

## 2025-08-11 LAB — TSH SERPL-ACNC: 1.51 UIU/ML

## 2025-08-21 ENCOUNTER — NON-APPOINTMENT (OUTPATIENT)
Age: 40
End: 2025-08-21